# Patient Record
Sex: MALE | Race: BLACK OR AFRICAN AMERICAN
[De-identification: names, ages, dates, MRNs, and addresses within clinical notes are randomized per-mention and may not be internally consistent; named-entity substitution may affect disease eponyms.]

---

## 2017-06-05 ENCOUNTER — HOSPITAL ENCOUNTER (OUTPATIENT)
Dept: HOSPITAL 18 - NAV LABSP | Age: 72
Discharge: HOME | End: 2017-06-05
Attending: INTERNAL MEDICINE
Payer: MEDICARE

## 2017-06-05 DIAGNOSIS — I48.91: Primary | ICD-10-CM

## 2017-06-05 DIAGNOSIS — I10: ICD-10-CM

## 2017-06-05 DIAGNOSIS — E11.9: ICD-10-CM

## 2017-06-05 DIAGNOSIS — R56.9: ICD-10-CM

## 2017-06-05 PROCEDURE — 36415 COLL VENOUS BLD VENIPUNCTURE: CPT

## 2017-06-05 PROCEDURE — 80164 ASSAY DIPROPYLACETIC ACD TOT: CPT

## 2017-06-05 PROCEDURE — 80185 ASSAY OF PHENYTOIN TOTAL: CPT

## 2017-06-19 ENCOUNTER — HOSPITAL ENCOUNTER (OUTPATIENT)
Dept: HOSPITAL 18 - NAV LABSP | Age: 72
Discharge: HOME | End: 2017-06-19
Attending: INTERNAL MEDICINE
Payer: MEDICARE

## 2017-06-19 DIAGNOSIS — I10: ICD-10-CM

## 2017-06-19 DIAGNOSIS — I48.91: ICD-10-CM

## 2017-06-19 DIAGNOSIS — E11.9: Primary | ICD-10-CM

## 2017-06-19 DIAGNOSIS — R56.9: ICD-10-CM

## 2017-06-19 LAB
BASOPHILS # BLD AUTO: 0.1 THOU/UL (ref 0–0.2)
BASOPHILS NFR BLD AUTO: 0.9 % (ref 0–1)
CHD RISK SERPL-RTO: 3.2 (ref ?–4.5)
CHOLEST SERPL-MCNC: 152 MG/DL
DIGOXIN SERPL-MCNC: (no result) NG/ML (ref 0.8–2)
EOSINOPHIL # BLD AUTO: 0.1 THOU/UL (ref 0–0.7)
EOSINOPHIL NFR BLD AUTO: 0.8 % (ref 0–10)
GLUCOSE SERPL-MCNC: 81 MG/DL (ref 83–110)
HDLC SERPL-MCNC: 47 MG/DL
HGB BLD-MCNC: 12.7 G/DL (ref 14–18)
LDLC SERPL CALC-MCNC: 92 MG/DL
LYMPHOCYTES # BLD AUTO: 2.8 THOU/UL (ref 1.2–3.4)
LYMPHOCYTES NFR BLD AUTO: 30.1 % (ref 21–51)
MCH RBC QN AUTO: 28.1 PG (ref 27–31)
MCV RBC AUTO: 86.9 FL (ref 80–94)
MONOCYTES # BLD AUTO: 0.6 THOU/UL (ref 0.11–0.59)
MONOCYTES NFR BLD AUTO: 6.8 % (ref 0–10)
NEUTROPHILS # BLD AUTO: 5.8 THOU/UL (ref 1.4–6.5)
NEUTROPHILS NFR BLD AUTO: 61.4 % (ref 42–75)
PLATELET # BLD AUTO: 278 THOU/UL (ref 130–400)
RBC # BLD AUTO: 4.53 MILL/UL (ref 4.7–6.1)
TRIGL SERPL-MCNC: 65 MG/DL (ref ?–150)
WBC # BLD AUTO: 9.4 THOU/UL (ref 4.8–10.8)

## 2017-06-19 PROCEDURE — 80185 ASSAY OF PHENYTOIN TOTAL: CPT

## 2017-06-19 PROCEDURE — 82947 ASSAY GLUCOSE BLOOD QUANT: CPT

## 2017-06-19 PROCEDURE — 83036 HEMOGLOBIN GLYCOSYLATED A1C: CPT

## 2017-06-19 PROCEDURE — 85025 COMPLETE CBC W/AUTO DIFF WBC: CPT

## 2017-06-19 PROCEDURE — 80162 ASSAY OF DIGOXIN TOTAL: CPT

## 2017-06-19 PROCEDURE — 80061 LIPID PANEL: CPT

## 2017-06-19 PROCEDURE — 36415 COLL VENOUS BLD VENIPUNCTURE: CPT

## 2017-06-22 ENCOUNTER — HOSPITAL ENCOUNTER (OUTPATIENT)
Dept: HOSPITAL 18 - NAV LABSP | Age: 72
Discharge: HOME | End: 2017-06-22
Attending: INTERNAL MEDICINE
Payer: MEDICARE

## 2017-06-22 DIAGNOSIS — G40.89: Primary | ICD-10-CM

## 2017-06-22 PROCEDURE — 36415 COLL VENOUS BLD VENIPUNCTURE: CPT

## 2017-06-22 PROCEDURE — 80177 DRUG SCRN QUAN LEVETIRACETAM: CPT

## 2017-06-28 ENCOUNTER — HOSPITAL ENCOUNTER (OUTPATIENT)
Dept: HOSPITAL 18 - NAV NNR | Age: 72
Discharge: HOME | End: 2017-06-28
Attending: INTERNAL MEDICINE
Payer: MEDICARE

## 2017-06-28 DIAGNOSIS — R56.9: ICD-10-CM

## 2017-06-28 DIAGNOSIS — I48.0: Primary | ICD-10-CM

## 2017-06-28 DIAGNOSIS — E11.9: ICD-10-CM

## 2017-06-28 LAB
ALBUMIN SERPL BCG-MCNC: 3.6 G/DL (ref 3.4–4.8)
ALP SERPL-CCNC: 69 U/L (ref 40–150)
ALT SERPL W P-5'-P-CCNC: 13 U/L (ref 8–55)
ANION GAP SERPL CALC-SCNC: 15 MMOL/L (ref 10–20)
AST SERPL-CCNC: 11 U/L (ref 5–34)
BASOPHILS # BLD AUTO: 0.1 THOU/UL (ref 0–0.2)
BASOPHILS NFR BLD AUTO: 1.1 % (ref 0–1)
BILIRUB SERPL-MCNC: 0.3 MG/DL (ref 0.2–1.2)
BUN SERPL-MCNC: 12 MG/DL (ref 8.4–25.7)
CALCIUM SERPL-MCNC: 10 MG/DL (ref 7.8–10.44)
CHLORIDE SERPL-SCNC: 106 MMOL/L (ref 98–107)
CO2 SERPL-SCNC: 23 MMOL/L (ref 23–31)
CREAT CL PREDICTED SERPL C-G-VRATE: 0 ML/MIN (ref 70–130)
EOSINOPHIL # BLD AUTO: 0.1 THOU/UL (ref 0–0.7)
EOSINOPHIL NFR BLD AUTO: 1.1 % (ref 0–10)
GLOBULIN SER CALC-MCNC: 3.4 G/DL (ref 2.4–3.5)
GLUCOSE SERPL-MCNC: 83 MG/DL (ref 80–115)
HGB BLD-MCNC: 12.9 G/DL (ref 14–18)
LYMPHOCYTES # BLD AUTO: 2.8 THOU/UL (ref 1.2–3.4)
LYMPHOCYTES NFR BLD AUTO: 31.1 % (ref 21–51)
MCH RBC QN AUTO: 28.1 PG (ref 27–31)
MCV RBC AUTO: 88.5 FL (ref 80–94)
MONOCYTES # BLD AUTO: 0.6 THOU/UL (ref 0.11–0.59)
MONOCYTES NFR BLD AUTO: 7.1 % (ref 0–10)
NEUTROPHILS # BLD AUTO: 5.3 THOU/UL (ref 1.4–6.5)
NEUTROPHILS NFR BLD AUTO: 59.6 % (ref 42–75)
PLATELET # BLD AUTO: 178 THOU/UL (ref 130–400)
POTASSIUM SERPL-SCNC: 4.5 MMOL/L (ref 3.5–5.1)
RBC # BLD AUTO: 4.57 MILL/UL (ref 4.7–6.1)
SODIUM SERPL-SCNC: 139 MMOL/L (ref 136–145)
WBC # BLD AUTO: 8.9 THOU/UL (ref 4.8–10.8)

## 2017-06-28 PROCEDURE — 85025 COMPLETE CBC W/AUTO DIFF WBC: CPT

## 2017-06-28 PROCEDURE — 80053 COMPREHEN METABOLIC PANEL: CPT

## 2017-06-28 PROCEDURE — 80164 ASSAY DIPROPYLACETIC ACD TOT: CPT

## 2017-06-28 PROCEDURE — 80185 ASSAY OF PHENYTOIN TOTAL: CPT

## 2017-07-19 ENCOUNTER — HOSPITAL ENCOUNTER (OUTPATIENT)
Dept: HOSPITAL 18 - NAV LABSP | Age: 72
Discharge: HOME | End: 2017-07-19
Attending: INTERNAL MEDICINE
Payer: MEDICARE

## 2017-07-19 DIAGNOSIS — R56.9: Primary | ICD-10-CM

## 2017-07-19 PROCEDURE — 36415 COLL VENOUS BLD VENIPUNCTURE: CPT

## 2017-07-19 PROCEDURE — 80185 ASSAY OF PHENYTOIN TOTAL: CPT

## 2017-07-21 ENCOUNTER — HOSPITAL ENCOUNTER (EMERGENCY)
Dept: HOSPITAL 18 - NAV ERS | Age: 72
Discharge: HOME | End: 2017-07-21
Payer: MEDICARE
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
## 2017-07-21 DIAGNOSIS — I10: ICD-10-CM

## 2017-07-21 DIAGNOSIS — Z79.82: ICD-10-CM

## 2017-07-21 DIAGNOSIS — E11.9: ICD-10-CM

## 2017-07-21 DIAGNOSIS — I48.91: ICD-10-CM

## 2017-07-21 DIAGNOSIS — F41.9: ICD-10-CM

## 2017-07-21 DIAGNOSIS — E78.5: ICD-10-CM

## 2017-07-21 DIAGNOSIS — Z79.899: ICD-10-CM

## 2017-07-21 DIAGNOSIS — S01.01XA: Primary | ICD-10-CM

## 2017-07-21 PROCEDURE — 70450 CT HEAD/BRAIN W/O DYE: CPT

## 2017-07-21 PROCEDURE — 12002 RPR S/N/AX/GEN/TRNK2.6-7.5CM: CPT

## 2017-07-21 NOTE — CT
CT HEAD NONCONTRAST 

7/21/17

 

HISTORY: 

Fall. Head injury.

 

FINDINGS:  

Motion artifact obscures detail. There is no mass effect or shift of midline structures. No evidence
 of acute intracranial hemorrhage or infarct. Severe diffuse cortical atrophy is apparent. Scalp lac
eration is noted over the right frontal calvarium. 

 

IMPRESSION:  

Severe cortical atrophy. No acute intracranial abnormalities are demonstrated. 

 

POS: [facility]

## 2017-07-24 ENCOUNTER — HOSPITAL ENCOUNTER (OUTPATIENT)
Dept: HOSPITAL 18 - NAV LABSP | Age: 72
Discharge: HOME | End: 2017-07-24
Attending: INTERNAL MEDICINE
Payer: MEDICARE

## 2017-07-24 DIAGNOSIS — R56.9: Primary | ICD-10-CM

## 2017-07-24 PROCEDURE — 80185 ASSAY OF PHENYTOIN TOTAL: CPT

## 2017-07-24 PROCEDURE — 36415 COLL VENOUS BLD VENIPUNCTURE: CPT

## 2017-08-09 ENCOUNTER — HOSPITAL ENCOUNTER (OUTPATIENT)
Dept: HOSPITAL 18 - NAVSJIPCSP | Age: 72
Discharge: HOME | End: 2017-08-09
Attending: INTERNAL MEDICINE
Payer: MEDICARE

## 2017-08-09 DIAGNOSIS — R56.9: Primary | ICD-10-CM

## 2017-08-09 PROCEDURE — 80164 ASSAY DIPROPYLACETIC ACD TOT: CPT

## 2017-08-09 PROCEDURE — 36415 COLL VENOUS BLD VENIPUNCTURE: CPT

## 2017-08-09 PROCEDURE — 80185 ASSAY OF PHENYTOIN TOTAL: CPT

## 2017-08-11 ENCOUNTER — HOSPITAL ENCOUNTER (OUTPATIENT)
Dept: HOSPITAL 18 - NAV LABSP | Age: 72
Discharge: HOME | End: 2017-08-11
Attending: INTERNAL MEDICINE
Payer: MEDICARE

## 2017-08-11 DIAGNOSIS — R56.9: Primary | ICD-10-CM

## 2017-08-11 PROCEDURE — 36415 COLL VENOUS BLD VENIPUNCTURE: CPT

## 2017-08-11 PROCEDURE — 80185 ASSAY OF PHENYTOIN TOTAL: CPT

## 2017-08-16 ENCOUNTER — HOSPITAL ENCOUNTER (OUTPATIENT)
Dept: HOSPITAL 18 - NAV LABSP | Age: 72
Discharge: HOME | End: 2017-08-16
Attending: INTERNAL MEDICINE
Payer: MEDICARE

## 2017-08-16 DIAGNOSIS — R56.9: Primary | ICD-10-CM

## 2017-08-16 PROCEDURE — 80185 ASSAY OF PHENYTOIN TOTAL: CPT

## 2017-08-16 PROCEDURE — 36415 COLL VENOUS BLD VENIPUNCTURE: CPT

## 2017-08-21 ENCOUNTER — HOSPITAL ENCOUNTER (OUTPATIENT)
Dept: HOSPITAL 18 - NAV LABSP | Age: 72
Discharge: HOME | End: 2017-08-21
Attending: INTERNAL MEDICINE
Payer: MEDICARE

## 2017-08-21 DIAGNOSIS — R56.9: Primary | ICD-10-CM

## 2017-08-21 PROCEDURE — 36415 COLL VENOUS BLD VENIPUNCTURE: CPT

## 2017-08-21 PROCEDURE — 80185 ASSAY OF PHENYTOIN TOTAL: CPT

## 2017-09-26 ENCOUNTER — HOSPITAL ENCOUNTER (OUTPATIENT)
Dept: HOSPITAL 18 - NAV LABSP | Age: 72
Discharge: HOME | End: 2017-09-26
Attending: INTERNAL MEDICINE
Payer: MEDICARE

## 2017-09-26 DIAGNOSIS — E11.9: Primary | ICD-10-CM

## 2017-09-26 DIAGNOSIS — R56.9: ICD-10-CM

## 2017-09-26 DIAGNOSIS — I10: ICD-10-CM

## 2017-09-26 DIAGNOSIS — I48.91: ICD-10-CM

## 2017-09-26 LAB
CHD RISK SERPL-RTO: 3.3 (ref ?–4.5)
CHOLEST SERPL-MCNC: 163 MG/DL
DIGOXIN SERPL-MCNC: 0.17 NG/ML (ref 0.8–2)
GLUCOSE SERPL-MCNC: 69 MG/DL (ref 83–110)
HDLC SERPL-MCNC: 50 MG/DL
LDLC SERPL CALC-MCNC: 100 MG/DL
TRIGL SERPL-MCNC: 65 MG/DL (ref ?–150)

## 2017-09-26 PROCEDURE — 80162 ASSAY OF DIGOXIN TOTAL: CPT

## 2017-09-26 PROCEDURE — 80185 ASSAY OF PHENYTOIN TOTAL: CPT

## 2017-09-26 PROCEDURE — 80061 LIPID PANEL: CPT

## 2017-09-26 PROCEDURE — 83036 HEMOGLOBIN GLYCOSYLATED A1C: CPT

## 2017-09-26 PROCEDURE — 36415 COLL VENOUS BLD VENIPUNCTURE: CPT

## 2017-09-26 PROCEDURE — 82947 ASSAY GLUCOSE BLOOD QUANT: CPT

## 2018-08-31 ENCOUNTER — HOSPITAL ENCOUNTER (OUTPATIENT)
Dept: HOSPITAL 18 - NAV NNR | Age: 73
Discharge: HOME | End: 2018-08-31
Attending: INTERNAL MEDICINE
Payer: MEDICARE

## 2018-08-31 DIAGNOSIS — G40.909: Primary | ICD-10-CM

## 2018-08-31 LAB
ALBUMIN SERPL BCG-MCNC: 3.5 G/DL (ref 3.4–4.8)
ALP SERPL-CCNC: 84 U/L (ref 40–150)
ALT SERPL W P-5'-P-CCNC: 13 U/L (ref 8–55)
ANION GAP SERPL CALC-SCNC: 12 MMOL/L (ref 10–20)
AST SERPL-CCNC: 13 U/L (ref 5–34)
BASOPHILS # BLD AUTO: 0.1 THOU/UL (ref 0–0.2)
BASOPHILS NFR BLD AUTO: 1 % (ref 0–1)
BILIRUB SERPL-MCNC: 0.3 MG/DL (ref 0.2–1.2)
BUN SERPL-MCNC: 14 MG/DL (ref 8.4–25.7)
CALCIUM SERPL-MCNC: 9.7 MG/DL (ref 7.8–10.44)
CHLORIDE SERPL-SCNC: 105 MMOL/L (ref 98–107)
CO2 SERPL-SCNC: 28 MMOL/L (ref 23–31)
CREAT CL PREDICTED SERPL C-G-VRATE: 0 ML/MIN (ref 70–130)
EOSINOPHIL # BLD AUTO: 0.2 THOU/UL (ref 0–0.7)
EOSINOPHIL NFR BLD AUTO: 2 % (ref 0–10)
GLOBULIN SER CALC-MCNC: 3.1 G/DL (ref 2.4–3.5)
GLUCOSE SERPL-MCNC: 117 MG/DL (ref 83–110)
HGB BLD-MCNC: 13.8 G/DL (ref 14–18)
LYMPHOCYTES # BLD AUTO: 2.9 THOU/UL (ref 1.2–3.4)
LYMPHOCYTES NFR BLD AUTO: 29.6 % (ref 21–51)
MCH RBC QN AUTO: 26.4 PG (ref 27–31)
MCV RBC AUTO: 86.3 FL (ref 78–98)
MONOCYTES # BLD AUTO: 0.9 THOU/UL (ref 0.11–0.59)
MONOCYTES NFR BLD AUTO: 8.8 % (ref 0–10)
NEUTROPHILS # BLD AUTO: 5.8 THOU/UL (ref 1.4–6.5)
NEUTROPHILS NFR BLD AUTO: 58.6 % (ref 42–75)
PLATELET # BLD AUTO: 303 THOU/UL (ref 130–400)
POTASSIUM SERPL-SCNC: 4.7 MMOL/L (ref 3.5–5.1)
RBC # BLD AUTO: 5.24 MILL/UL (ref 4.7–6.1)
SODIUM SERPL-SCNC: 140 MMOL/L (ref 136–145)
WBC # BLD AUTO: 9.9 THOU/UL (ref 4.8–10.8)

## 2018-08-31 PROCEDURE — 85025 COMPLETE CBC W/AUTO DIFF WBC: CPT

## 2018-08-31 PROCEDURE — 80185 ASSAY OF PHENYTOIN TOTAL: CPT

## 2018-08-31 PROCEDURE — 80053 COMPREHEN METABOLIC PANEL: CPT

## 2018-09-03 ENCOUNTER — HOSPITAL ENCOUNTER (EMERGENCY)
Dept: HOSPITAL 18 - NAV ERS | Age: 73
Discharge: NURSING, REHAB OR LONG-TERM CARE | End: 2018-09-03
Payer: MEDICARE

## 2018-09-03 DIAGNOSIS — R79.89: ICD-10-CM

## 2018-09-03 DIAGNOSIS — Z79.899: ICD-10-CM

## 2018-09-03 DIAGNOSIS — F32.9: ICD-10-CM

## 2018-09-03 DIAGNOSIS — E78.5: ICD-10-CM

## 2018-09-03 DIAGNOSIS — N39.0: Primary | ICD-10-CM

## 2018-09-03 DIAGNOSIS — G40.909: ICD-10-CM

## 2018-09-03 DIAGNOSIS — F02.80: ICD-10-CM

## 2018-09-03 DIAGNOSIS — K21.9: ICD-10-CM

## 2018-09-03 DIAGNOSIS — I48.91: ICD-10-CM

## 2018-09-03 DIAGNOSIS — E11.9: ICD-10-CM

## 2018-09-03 DIAGNOSIS — G30.9: ICD-10-CM

## 2018-09-03 DIAGNOSIS — Z79.82: ICD-10-CM

## 2018-09-03 DIAGNOSIS — I10: ICD-10-CM

## 2018-09-03 LAB
ANION GAP SERPL CALC-SCNC: 16 MMOL/L (ref 10–20)
BASOPHILS # BLD AUTO: 0 THOU/UL (ref 0–0.2)
BASOPHILS NFR BLD AUTO: 1 % (ref 0–1)
BUN SERPL-MCNC: 14 MG/DL (ref 8.4–25.7)
CALCIUM SERPL-MCNC: 9.5 MG/DL (ref 7.8–10.44)
CHLORIDE SERPL-SCNC: 108 MMOL/L (ref 98–107)
CO2 SERPL-SCNC: 22 MMOL/L (ref 23–31)
CREAT CL PREDICTED SERPL C-G-VRATE: 0 ML/MIN (ref 70–130)
EOSINOPHIL # BLD AUTO: 0.1 THOU/UL (ref 0–0.7)
EOSINOPHIL NFR BLD AUTO: 1.2 % (ref 0–10)
GLUCOSE SERPL-MCNC: 75 MG/DL (ref 83–110)
HGB BLD-MCNC: 13.3 G/DL (ref 14–18)
LYMPHOCYTES # BLD AUTO: 1.9 THOU/UL (ref 1.2–3.4)
LYMPHOCYTES NFR BLD AUTO: 40.5 % (ref 21–51)
MCH RBC QN AUTO: 27.4 PG (ref 27–31)
MCV RBC AUTO: 87.4 FL (ref 78–98)
MONOCYTES # BLD AUTO: 0.4 THOU/UL (ref 0.11–0.59)
MONOCYTES NFR BLD AUTO: 9.3 % (ref 0–10)
NEUTROPHILS # BLD AUTO: 2.3 THOU/UL (ref 1.4–6.5)
NEUTROPHILS NFR BLD AUTO: 48.1 % (ref 42–75)
PLATELET # BLD AUTO: (no result) THOU/UL (ref 130–400)
PLATELET BLD QL SMEAR: (no result)
POTASSIUM SERPL-SCNC: 5.6 MMOL/L (ref 3.5–5.1)
PROT UR STRIP.AUTO-MCNC: 30 MG/DL
RBC # BLD AUTO: 4.87 MILL/UL (ref 4.7–6.1)
RBC UR QL AUTO: (no result) HPF (ref 0–3)
SODIUM SERPL-SCNC: 140 MMOL/L (ref 136–145)
SP GR UR STRIP: 1.03 (ref 1–1.04)
UNIDENT CRYS #/AREA URNS HPF: (no result) HPF
WBC # BLD AUTO: 4.7 THOU/UL (ref 4.8–10.8)
WBC UR QL AUTO: (no result) HPF (ref 0–3)

## 2018-09-03 PROCEDURE — 82140 ASSAY OF AMMONIA: CPT

## 2018-09-03 PROCEDURE — 96361 HYDRATE IV INFUSION ADD-ON: CPT

## 2018-09-03 PROCEDURE — 81015 MICROSCOPIC EXAM OF URINE: CPT

## 2018-09-03 PROCEDURE — 51701 INSERT BLADDER CATHETER: CPT

## 2018-09-03 PROCEDURE — 85025 COMPLETE CBC W/AUTO DIFF WBC: CPT

## 2018-09-03 PROCEDURE — 96360 HYDRATION IV INFUSION INIT: CPT

## 2018-09-03 PROCEDURE — 80048 BASIC METABOLIC PNL TOTAL CA: CPT

## 2018-09-03 PROCEDURE — 81003 URINALYSIS AUTO W/O SCOPE: CPT

## 2018-09-03 PROCEDURE — 80185 ASSAY OF PHENYTOIN TOTAL: CPT

## 2018-09-03 PROCEDURE — 93005 ELECTROCARDIOGRAM TRACING: CPT

## 2019-05-17 ENCOUNTER — HOSPITAL ENCOUNTER (OUTPATIENT)
Dept: HOSPITAL 18 - NAV LABSP | Age: 74
Discharge: HOME | End: 2019-05-17
Attending: INTERNAL MEDICINE
Payer: MEDICARE

## 2019-05-17 DIAGNOSIS — G40.909: Primary | ICD-10-CM

## 2019-05-17 LAB
BASOPHILS # BLD AUTO: 0.1 THOU/UL (ref 0–0.2)
BASOPHILS NFR BLD AUTO: 0.8 % (ref 0–1)
EOSINOPHIL # BLD AUTO: 0 THOU/UL (ref 0–0.7)
EOSINOPHIL NFR BLD AUTO: 0.3 % (ref 0–10)
HGB BLD-MCNC: 12.2 G/DL (ref 14–18)
LYMPHOCYTES # BLD AUTO: 2 THOU/UL (ref 1.2–3.4)
LYMPHOCYTES NFR BLD AUTO: 23.7 % (ref 21–51)
MCH RBC QN AUTO: 27.2 PG (ref 27–31)
MCV RBC AUTO: 87.6 FL (ref 78–98)
MONOCYTES # BLD AUTO: 0.9 THOU/UL (ref 0.11–0.59)
MONOCYTES NFR BLD AUTO: 10.3 % (ref 0–10)
NEUTROPHILS # BLD AUTO: 5.6 THOU/UL (ref 1.4–6.5)
NEUTROPHILS NFR BLD AUTO: 64.9 % (ref 42–75)
PLATELET # BLD AUTO: 299 THOU/UL (ref 130–400)
RBC # BLD AUTO: 4.47 MILL/UL (ref 4.7–6.1)
WBC # BLD AUTO: 8.6 THOU/UL (ref 4.8–10.8)

## 2019-05-17 PROCEDURE — 36415 COLL VENOUS BLD VENIPUNCTURE: CPT

## 2019-05-17 PROCEDURE — 85025 COMPLETE CBC W/AUTO DIFF WBC: CPT

## 2019-10-09 ENCOUNTER — HOSPITAL ENCOUNTER (EMERGENCY)
Dept: HOSPITAL 18 - NAV ERS | Age: 74
Discharge: NURSING, REHAB OR LONG-TERM CARE | End: 2019-10-09
Payer: MEDICARE

## 2019-10-09 DIAGNOSIS — F32.9: ICD-10-CM

## 2019-10-09 DIAGNOSIS — I48.91: ICD-10-CM

## 2019-10-09 DIAGNOSIS — F25.9: ICD-10-CM

## 2019-10-09 DIAGNOSIS — R50.9: Primary | ICD-10-CM

## 2019-10-09 DIAGNOSIS — E78.5: ICD-10-CM

## 2019-10-09 DIAGNOSIS — Z79.899: ICD-10-CM

## 2019-10-09 DIAGNOSIS — I10: ICD-10-CM

## 2019-10-09 DIAGNOSIS — I49.9: ICD-10-CM

## 2019-10-09 DIAGNOSIS — E11.9: ICD-10-CM

## 2019-10-09 DIAGNOSIS — F02.80: ICD-10-CM

## 2019-10-09 DIAGNOSIS — K21.9: ICD-10-CM

## 2019-10-09 DIAGNOSIS — G30.9: ICD-10-CM

## 2019-10-09 DIAGNOSIS — G40.909: ICD-10-CM

## 2019-10-09 DIAGNOSIS — Z79.82: ICD-10-CM

## 2019-10-09 PROCEDURE — 99283 EMERGENCY DEPT VISIT LOW MDM: CPT

## 2019-10-09 PROCEDURE — 87804 INFLUENZA ASSAY W/OPTIC: CPT

## 2020-09-28 ENCOUNTER — HOSPITAL ENCOUNTER (EMERGENCY)
Dept: HOSPITAL 18 - NAV ERS | Age: 75
Discharge: ANOTHER HOSPITAL | End: 2020-09-28
Payer: MEDICARE

## 2020-09-28 ENCOUNTER — HOSPITAL ENCOUNTER (INPATIENT)
Dept: HOSPITAL 92 - ERS | Age: 75
LOS: 4 days | Discharge: NURSING, REHAB OR LONG-TERM CARE | DRG: 27 | End: 2020-10-02
Attending: SPECIALIST | Admitting: SPECIALIST
Payer: MEDICARE

## 2020-09-28 VITALS — BODY MASS INDEX: 13.6 KG/M2

## 2020-09-28 DIAGNOSIS — R40.2123: ICD-10-CM

## 2020-09-28 DIAGNOSIS — Z79.82: ICD-10-CM

## 2020-09-28 DIAGNOSIS — S06.5X0A: Primary | ICD-10-CM

## 2020-09-28 DIAGNOSIS — G40.909: ICD-10-CM

## 2020-09-28 DIAGNOSIS — S01.81XA: ICD-10-CM

## 2020-09-28 DIAGNOSIS — F32.9: ICD-10-CM

## 2020-09-28 DIAGNOSIS — G30.9: ICD-10-CM

## 2020-09-28 DIAGNOSIS — F02.80: ICD-10-CM

## 2020-09-28 DIAGNOSIS — Z79.899: ICD-10-CM

## 2020-09-28 DIAGNOSIS — I48.91: ICD-10-CM
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
## 2020-09-28 DIAGNOSIS — R40.2343: ICD-10-CM

## 2020-09-28 DIAGNOSIS — R40.2233: ICD-10-CM

## 2020-09-28 DIAGNOSIS — E11.9: ICD-10-CM

## 2020-09-28 DIAGNOSIS — I49.9: ICD-10-CM

## 2020-09-28 DIAGNOSIS — E63.9: ICD-10-CM

## 2020-09-28 DIAGNOSIS — Z23: ICD-10-CM

## 2020-09-28 DIAGNOSIS — K21.9: ICD-10-CM

## 2020-09-28 DIAGNOSIS — Z20.828: ICD-10-CM

## 2020-09-28 DIAGNOSIS — E78.5: ICD-10-CM

## 2020-09-28 DIAGNOSIS — I10: ICD-10-CM

## 2020-09-28 DIAGNOSIS — S06.5X9A: Primary | ICD-10-CM

## 2020-09-28 DIAGNOSIS — Z90.5: ICD-10-CM
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
## 2020-09-28 DIAGNOSIS — F41.9: ICD-10-CM
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
## 2020-09-28 DIAGNOSIS — F25.9: ICD-10-CM

## 2020-09-28 DIAGNOSIS — K70.30: ICD-10-CM

## 2020-09-28 LAB
ALBUMIN SERPL BCG-MCNC: 4.2 G/DL (ref 3.4–4.8)
ALP SERPL-CCNC: 93 U/L (ref 40–110)
ALT SERPL W P-5'-P-CCNC: 13 U/L (ref 8–55)
ANION GAP SERPL CALC-SCNC: 18 MMOL/L (ref 10–20)
APTT PPP: 27.6 SEC (ref 22.9–36.1)
AST SERPL-CCNC: 19 U/L (ref 5–34)
BASOPHILS # BLD AUTO: 0.1 THOU/UL (ref 0–0.2)
BASOPHILS NFR BLD AUTO: 0.4 % (ref 0–1)
BILIRUB SERPL-MCNC: 0.5 MG/DL (ref 0.2–1.2)
BUN SERPL-MCNC: 9 MG/DL (ref 8.4–25.7)
CALCIUM SERPL-MCNC: 10.5 MG/DL (ref 7.8–10.44)
CHLORIDE SERPL-SCNC: 106 MMOL/L (ref 98–107)
CO2 SERPL-SCNC: 24 MMOL/L (ref 23–31)
CREAT CL PREDICTED SERPL C-G-VRATE: 0 ML/MIN (ref 70–130)
EOSINOPHIL # BLD AUTO: 0 THOU/UL (ref 0–0.7)
EOSINOPHIL NFR BLD AUTO: 0.1 % (ref 0–10)
GLOBULIN SER CALC-MCNC: 3.6 G/DL (ref 2.4–3.5)
GLUCOSE SERPL-MCNC: 136 MG/DL (ref 83–110)
HGB BLD-MCNC: 12.6 G/DL (ref 14–18)
INR PPP: 1.1
LYMPHOCYTES # BLD AUTO: 1.7 THOU/UL (ref 1.2–3.4)
LYMPHOCYTES NFR BLD AUTO: 9.7 % (ref 21–51)
MCH RBC QN AUTO: 26.8 PG (ref 27–31)
MCV RBC AUTO: 89.5 FL (ref 78–98)
MDIFF COMPLETE?: YES
MONOCYTES # BLD AUTO: 1.2 THOU/UL (ref 0.11–0.59)
MONOCYTES NFR BLD AUTO: 6.7 % (ref 0–10)
NEUTROPHILS # BLD AUTO: 14.8 THOU/UL (ref 1.4–6.5)
NEUTROPHILS NFR BLD AUTO: 83.1 % (ref 42–75)
PLATELET # BLD AUTO: 328 THOU/UL (ref 130–400)
POTASSIUM SERPL-SCNC: 4.3 MMOL/L (ref 3.5–5.1)
PROTHROMBIN TIME: 13.7 SEC (ref 12–14.7)
RBC # BLD AUTO: 4.71 MILL/UL (ref 4.7–6.1)
SODIUM SERPL-SCNC: 144 MMOL/L (ref 136–145)
TARGETS BLD QL SMEAR: (no result) (100X)
WBC # BLD AUTO: 17.8 THOU/UL (ref 4.8–10.8)

## 2020-09-28 PROCEDURE — 94002 VENT MGMT INPAT INIT DAY: CPT

## 2020-09-28 PROCEDURE — 72125 CT NECK SPINE W/O DYE: CPT

## 2020-09-28 PROCEDURE — S0028 INJECTION, FAMOTIDINE, 20 MG: HCPCS

## 2020-09-28 PROCEDURE — 36415 COLL VENOUS BLD VENIPUNCTURE: CPT

## 2020-09-28 PROCEDURE — G0390 TRAUMA RESPONS W/HOSP CRITI: HCPCS

## 2020-09-28 PROCEDURE — 93005 ELECTROCARDIOGRAM TRACING: CPT

## 2020-09-28 PROCEDURE — 74018 RADEX ABDOMEN 1 VIEW: CPT

## 2020-09-28 PROCEDURE — 87635 SARS-COV-2 COVID-19 AMP PRB: CPT

## 2020-09-28 PROCEDURE — 84484 ASSAY OF TROPONIN QUANT: CPT

## 2020-09-28 PROCEDURE — U0003 INFECTIOUS AGENT DETECTION BY NUCLEIC ACID (DNA OR RNA); SEVERE ACUTE RESPIRATORY SYNDROME CORONAVIRUS 2 (SARS-COV-2) (CORONAVIRUS DISEASE [COVID-19]), AMPLIFIED PROBE TECHNIQUE, MAKING USE OF HIGH THROUGHPUT TECHNOLOGIES AS DESCRIBED BY CMS-2020-01-R: HCPCS

## 2020-09-28 PROCEDURE — 12001 RPR S/N/AX/GEN/TRNK 2.5CM/<: CPT

## 2020-09-28 PROCEDURE — 80053 COMPREHEN METABOLIC PANEL: CPT

## 2020-09-28 PROCEDURE — 70450 CT HEAD/BRAIN W/O DYE: CPT

## 2020-09-28 PROCEDURE — 90471 IMMUNIZATION ADMIN: CPT

## 2020-09-28 PROCEDURE — 36416 COLLJ CAPILLARY BLOOD SPEC: CPT

## 2020-09-28 PROCEDURE — 94003 VENT MGMT INPAT SUBQ DAY: CPT

## 2020-09-28 PROCEDURE — 85730 THROMBOPLASTIN TIME PARTIAL: CPT

## 2020-09-28 PROCEDURE — 84100 ASSAY OF PHOSPHORUS: CPT

## 2020-09-28 PROCEDURE — 85610 PROTHROMBIN TIME: CPT

## 2020-09-28 PROCEDURE — 85025 COMPLETE CBC W/AUTO DIFF WBC: CPT

## 2020-09-28 PROCEDURE — 82805 BLOOD GASES W/O2 SATURATION: CPT

## 2020-09-28 PROCEDURE — 90715 TDAP VACCINE 7 YRS/> IM: CPT

## 2020-09-28 PROCEDURE — 71045 X-RAY EXAM CHEST 1 VIEW: CPT

## 2020-09-28 PROCEDURE — 80048 BASIC METABOLIC PNL TOTAL CA: CPT

## 2020-09-28 PROCEDURE — 83735 ASSAY OF MAGNESIUM: CPT

## 2020-09-29 LAB
ANALYZER IN CARDIO: (no result)
BASE EXCESS STD BLDA CALC-SCNC: -5.1 MEQ/L
CA-I BLDA-SCNC: 1.28 MMOL/L (ref 1.12–1.3)
HCO3 BLDA-SCNC: 20.8 MEQ/L (ref 22–28)
HCT VFR BLDA CALC: 38 % (ref 42–52)
HGB BLDA-MCNC: 13 G/DL (ref 14–18)
MAGNESIUM SERPL-MCNC: 2.1 MG/DL (ref 1.6–2.6)
O2 A-A PPRESDIFF RESPIRATORY: 44.38 MMHG (ref 0–20)
PCO2 BLDA: 41.7 MMHG (ref 35–45)
PH BLDA: 7.32 [PH] (ref 7.35–7.45)
PO2 BLDA: 188.7 MMHG (ref 70–?)
POTASSIUM BLD-SCNC: 3.6 MMOL/L (ref 3.7–5.3)
SPECIMEN DRAWN FROM PATIENT: (no result)

## 2020-09-29 PROCEDURE — 00940ZZ DRAINAGE OF INTRACRANIAL SUBDURAL SPACE, OPEN APPROACH: ICD-10-PCS | Performed by: NEUROLOGICAL SURGERY

## 2020-09-29 RX ADMIN — LEVETIRACETAM SCH MLS: 10 INJECTION INTRAVENOUS at 22:49

## 2020-09-29 RX ADMIN — LEVETIRACETAM SCH MLS: 10 INJECTION INTRAVENOUS at 11:08

## 2020-09-29 RX ADMIN — FAMOTIDINE SCH MG: 10 INJECTION, SOLUTION INTRAVENOUS at 10:41

## 2020-09-29 RX ADMIN — CEFAZOLIN SODIUM SCH MLS: 2 SOLUTION INTRAVENOUS at 23:42

## 2020-09-29 RX ADMIN — CEFAZOLIN SODIUM SCH MLS: 2 SOLUTION INTRAVENOUS at 16:23

## 2020-09-29 NOTE — RAD
KUB:

 

INDICATION: 

NG tube placement.

 

FINDINGS: 

There is a Dobbhoff feed tube tip in the left lower lobe.  The lung bass are clear.  The bowel gas pa
ttern is unobstructed.  There is a retained metallic density in the right hemipelvis.  There are surg
ical clips within the upper abdomen.

 

IMPRESSION: 

Dobbhoff feeding tube tip in the left lower lobe.  

 

Findings were called to Florence Oneill, caring for this patient, at 3:00 p.m. on 9/29/2020.

 

CODE CR

 

POS: [facility]

## 2020-09-29 NOTE — OP
DATE OF PROCEDURE:  09/29/2020



FIRST ASSISTANT:  Brayan Barlow PA-C



INDICATION:  Prevent neurologic decline.



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



PROCEDURE PERFORMED:  Bilateral frontal alvin hole placement with evacuation of

hematoma. 



ANESTHESIA:  General.



DESCRIPTION OF PROCEDURE:  The patient was brought into the operating room and

placed under general anesthesia.  He was placed on table in a supine position.  
Two

small linear incisions were planned, one on the left and one on the right along 
the

frontal boss.  These areas were prepped and draped in the usual sterile fashion 
and

the skin was infiltrated subcutaneously with Marcaine with epinephrine.  After 
an

appropriate operative pause, the incisions were created.  Self-retaining 
retractors

were placed.  The  was used to place a alvin hole within each incision

site.  The underlying dura was identified and bipolar cautery was used to 
maintain

hemostasis.  A cruciate incision was then placed within the dura, where there 
was

immediate egress of chronic blood under quite a bit of pressure more so on the

right than on the left.  We spent a copious amount of irrigation, irrigating 
these 2

areas out until the blood started to clear.  Two red rubber tubes were then 
placed

in the subdural space through each hole and brought out through separate 
puncture

sites.  The wound was then irrigated.  Hemostasis was maintained throughout.  
The

wounds were then closed in anatomic layers, and a pressure dressing was applied.

There were no known procedural complications. 







Job ID:  [id number]



[facility]

## 2020-09-29 NOTE — CT
CT BRAIN WITHOUT CONTRAST:

 

HISTORY: 

Fall, seizure.

 

FINDINGS: 

Comparison is made with the exam of 7/21/2017.

 

Bilateral subdural hematomas are seen with 1 cm mediastinal shift to the left.  The right-sided subdu
ral hematoma is larger than the left and has a somewhat subacute appearance.  There is acute on chron
ic hemorrhage in the left subdural hematoma.  

 

The bony calvarium appears intact.  The visualized paranasal sinuses and mastoid air cells are well a
erated.

 

IMPRESSION: 

Bilateral subdural hematomas (acute on chronic on the left), right larger than left with subfalcine h
erniation to the left.

 

Discussed over the telephone with ER physician, Dr. Everardo Curtis, at 10:23 p.m.

 

CODE CR

 

POS: OFF

## 2020-09-29 NOTE — CT
PRELIMINARY REPORT/[facility]/EMERGENCY AFTER HOURS PROCEDURE:

 

EXAM: CT Cervical Spine Without Intravenous Contrast. 

 

CLINICAL HISTORY: M75,  C-COLLAR IN PLACE; FALL AT [facility] TODAY; NIGHT NURSE REPORTS ACTING S
TRANGE; UNSURE TIME HAPPENED; NOT ON BLOOD THINERS 

 

TECHNIQUE: Axial computed tomography images of the cervical spine without intravenous contrast. Sagit
danii and coronal reformations performed. 

 

COMPARISON: None provided. 

 

FINDINGS: 

BONES: No acute fracture or focal osseous lesion. Bony alignment is anatomic. 

DISCS / DEGENERATIVE CHANGES: Multilevel degenerative changes of the cervical spine with intervertebr
al disc space narrowing, facet and uncovertebral hypertrophy with spinal canal and neuroforaminal mary
nosis . Multilevel large anterior osteophytes. 

SOFT TISSUES: No prevertebral soft tissue swelling. No apical pneumothorax. 

 

IMPRESSION: No acute cervical spine abnormality.

 

ELECTRONICALLY SIGNED BY:

Mackenzie Lanza MD

Sep 29, 2020 12:41:33 AM CDT

 

 

FINAL REPORT 

 

EMERGENT AFTER HOURS CT CERVICAL SPINE WITHOUT CONTRAST:

 

HISTORY: 

Fall with neck pain.

 

FINDINGS/IMPRESSION: 

I agree with the findings and impression given in the preliminary report per [facility] physici
an.  Moderate degenerative changes of the cervical spine without acute osseous abnormality.

 

POS: OFF

## 2020-09-29 NOTE — PRG
DATE OF SERVICE:  09/29/2020



Mr. Esparza is a 75-year-old gentleman who resides in a nursing home with baseline

severe dementia.  He sustained a fall yesterday where he had a head laceration over

the left frontal region that would not hemostase.  As a result, he was brought into

the ER, where he underwent a head CT in [place].  It revealed the presence of what

was a predominantly chronic large subdural hematoma over the right frontoparietal

and temporal convexity with significant degree of midline shift.  Despite the fall

and despite imaging findings per Nursing, he is what is believed to be in his

baseline status.  He does take an adult aspirin but no other known blood thinners. 



The plan is to move forward with alvin hole evacuation of the hematoma this morning.

He could not consent secondary to his level of dementia and his nearly obtunded

state.  We have not been successful in reaching next of kin.  I have tried as has

Nursing.  We will move forward with implied emergent consent. 







Job ID:  [id number]

## 2020-09-29 NOTE — RAD
Portable frontal chest radiograph:

9/29/2020



COMPARISON: 11/16/2013



HISTORY: Altered mental status, preop patient



FINDINGS: Multiple postoperative clips are noted in the left upper quadrant. Stable hyperinflation. S
table old left clavicle fracture. No pneumothorax, pleural fluid, focal consolidation, or alveolar

edema.



IMPRESSION: No acute findings.



Reported By: Sohail Dupont 

Electronically Signed:  9/29/2020 7:38 AM

## 2020-09-29 NOTE — CON
DATE OF CONSULTATION:  



HISTORY OF PRESENT ILLNESS:  Mr. Esparza is a 75-year-old man, resident of a 
nursing

home, who reportedly found down earlier this afternoon at his nursing home on 
the

floor with a bleeding laceration to the scalp, left frontal over the eyebrow.  
Day nurse placed a bandage over the wound and helped him back to bed.  Night 
shift came and upon initial evaluation,

noted that he was continuing to bleed and he was somewhat altered from his 
baseline

mental status.  He is an Alzheimer's patient with rather profound degree of

dementia, who is A and O x0 and apparently and while awake, he is

extraordinarily combative and sounds not ambulatory.  CT scan performed at [facility] reveals profoundly large bilateral subdural hematomas, right greater 
the

left with extraordinary compression of the underlying brain parenchyma, again 
right

worse than left.  All of this looks to be chronic in nature with only some scant

acute hemorrhage in the left subdural.  There is a slight degree of 
right-to-left

midline shift.  Again, the distortion and the degree of compression to the brain

parenchyma diffusely is rather profound.  He does have likely ventriculomegaly,

though difficult to assess this given the degree of compression and distortion 
of

the intracranial vault.  ED 

reports the patient was not on blood thinners, although within his current

medications he does take 325 mg aspirin daily.  This certainly contributes to

ongoing hemorrhage. 



CURRENT MEDICATIONS:  

1. Zantac.

2. Atenolol.

3. Depakote ER.

4. Phenytoin.

5. Remeron.



ALLERGIES:  NO KNOWN DRUG ALLERGIES.



PAST MEDICAL HISTORY:  Cardiac arrhythmia, atrial fibrillation, diabetes type 2,

cirrhosis of the liver, gastroesophageal reflux, hyperlipidemia, and 
Alzheimer's. 



SURGICAL HISTORY:  None listed. 



Family contact, unavailable, none listed.



PHYSICAL EXAMINATION:

At the bedside, patient again is awake.  He does intermittently follow simple

commands, namely wiggling his toes and squeezing my hand with bilateral upper

extremities.  He will flutter his eyelids when asked to open his eyes, but only

mumbles when spoken to.  Pupils are unequal, left is constricted measuring 
roughly 1

mm to 2 mm and right is 3 mm, both are sluggishly reactive to light.  He has 
pretty

dense cataracts in both eyes.  Extraocular movements appear to be intact as he 
tries

to move his eyes away from the light.  He is constantly changing position in the
bed

and moving his legs around.  He reacts briskly to pain, withdrawing rather 
quickly. 



ASSESSMENT:  Acute on chronic subdural hematoma bilaterally with altered mental

status.  History of Alzheimer's. 



PLAN:  At this time, patient will be admitted to the hospital at [facility] for

close observation and bilateral frontal alvin hole and evacuation of bilateral

subdurals in the morning.  We will make attempts to reach out the family or next
of

contact to obtain consent for surgical intervention. 







Job ID:  [id number]



[facility]...

## 2020-09-29 NOTE — CT
PRELIMINARY REPORT/[facility]/EMERGENCY AFTER HOURS PROCEDURE:

 

Prior study September 28, 2020 at 9:58 PM submitted for comparison 

Extra-axial collections are stable when compared to prior CT scan. 

Stable mass-effect on the right cerebral hemisphere Midline shift measured approximately 8-9 mm.Josefa oneill electronically signed by Beba Stanton MD on September 29, 2020 2:49:31 AM CDT

 

 

 

POS: OFF

## 2020-09-29 NOTE — PRG
DATE OF SERVICE:  09/29/2020



HISTORY OF PRESENT ILLNESS:  The patient was admitted late last night, status post

ground level fall with delayed presentation, in which he sustained bilateral

subdural hematomas, right side being chronic with the left side being acute on

chronic.  The patient underwent alvin hole placement bilaterally today, which he

tolerated well.  Unfortunately, the patient was unable to be extubated due to being

very slow to awaken.  This morning, we saw the patient when his anesthesia was

discontinued.  He was very slow to again arouse and awaken, so it was felt that it

was best to allow him to remain on the ventilator overnight.  We will attempt to

extubate again in the morning.  The patient also had a Dobhoff tube placed as it is

suspected that he may require nutrition via Dobhoff during the stay. 



PHYSICAL EXAMINATION:

VITAL SIGNS:  Temperature is 98.1, heart rate 73, respirations 15, oxygen saturation

is 98%, and blood pressure is 109/65. 

GENERAL:  The patient is resting in bed.  When sedation is off, he would open his

eyes, but did not follow commands, by history this may be his baseline, but the

patient did become somnolent relatively quickly and was not overbreathing the vent.

He was moving all 4 extremities. 

HEENT:  Head, the patient has two alvin holes noted with drains in place. 

LUNGS:  Clear to auscultation bilaterally. 

HEART:  Regular rate and rhythm. 

ABDOMEN:  Soft and nontender with hypoactive bowel sounds. 

EXTREMITIES:  Capillary refill less than 3 seconds.  Pulses are 2+ x4.



LABORATORY DATA:  A pH 7.32, pO2 of 188, pCO2 of 41.7, base excess is -5.1.



RADIOGRAPHIC DATA:  There are no new radiographs to review this morning.



ASSESSMENT AND PLAN:  

1. Status post ground level fall, unwitnessed, with delayed presentation.

2. Bilateral subdural hematomas, right greater than left with midline shift, left

being acute on chronic, right being chronic. 

3. History of Alzheimer's, type 2 diabetes, atrial fibrillation, seizures,

hypertension, nutritional deficiency, depression, gastroesophageal reflux disease,

dysphagia, dementia, hyperlipidemia, alcoholic cirrhosis without ascites and

constipation. 

4. Status post bilateral alvin hole placement. 

Plan will be to continue full mechanical ventilatory support, trickle tube feeds

started tonight.  We have the nurses contacting his Nursing Facility for his home

medicine reconciliation.  We will reassess the patient in the morning and assess if

he is able to be extubated.  The patient was evaluated this morning and throughout

the day with Dr. Oneill. 







Job ID:  [id number]

## 2020-09-29 NOTE — HP
TRAUMA SURGEON:  Dr. Silva.



CONSULTING PHYSICIAN:  Dr. Nelson.



HISTORY OF PRESENT ILLNESS:  The patient is a 75-year-old male, who originally

presented to the [facility] from his nursing home after a 
fall.

Nursing home reports the patient was found down out of his bed around 1500 
yesterday

afternoon.  At that time, he had an abrasion, laceration to his left forehead.  
It

was bandaged and he was put back in the bed.  During the evening shift at the

nursing home, they reported increased bleeding from his forehead and he was sent
to

the [facility].  Over that location, the patient was 
evaluated

and found to have a very large subdural hemorrhage bilaterally on the right, 
worse

in the left with midline shift.  He was transferred to [facility]

[facility] for evaluation by Neurosurgery.  Upon my evaluation, the patient's

neurological status had declined from what was reported to me by the resident

physician, who contacted me to admit the patient.  My GCS was a total of 9, that
is

eyes 2, verbal 3, motor 4.  I did contact Peter Barlow, Dr. Nelson's physician

assistant, to discuss the patient's mentation.  We did agree to complete a CT of
the

brain this morning after my evaluation to further characterize any changes in 
the

size of the hematoma and midline shift.  Otherwise, the patient is scheduled to 
go

to the OR later this morning with Dr. Nelson for bilateral frontal alvin holes.  
He is

to be admitted to the ICU with q.1-hour neuro checks. 



REVIEW OF SYSTEMS:  Unable to complete due to patient's condition.



PAST MEDICAL HISTORY:  Alzheimer disease, type 2 diabetes, AFib, seizure 
disorder,

hypertension, nutritional deficiency, depression, GERD, dysphagia, dementia,

hyperlipidemia, alcoholic cirrhosis of the liver without ascites, constipation. 



PAST SURGICAL HISTORY:  Nephrectomy.



SOCIAL HISTORY:  The patient lives in a nursing home.  He has severe dementia 
and is

reported to be alert and oriented x0, and it is unclear if the patient ambulates
or

not at baseline. 



MEDICATIONS:  Include:

1. Digoxin.

2. Zetia.

3. Keppra.

4. Aspirin.

5. Atenolol.

6. Remeron.

7. Depakote.

8. Simvastatin.

9. Multivitamins.

10. Namenda.

11. Aricept.

12. Prilosec.

13. Dilantin.

14. Tylenol.

15. Maalox.

16. Benadryl.

17. Robitussin.

18. Loperamide.

19. Milk of magnesia.



ALLERGIES:  NO KNOWN DRUG ALLERGIES.



PHYSICAL EXAMINATION:

VITAL SIGNS:  Temperature 98.4, pulse 90, respirations 22, oxygen saturation 96%
on

room air, blood pressure 143/78. 



PRIMARY SURVEY: 

Airway intact. 

Adequate breath sounds bilaterally. 

2+ pulses in bilateral radials, femorals, and DPs. 

GCS 9 that is eyes 2, verbal 3, motor 4.  Pupils equal, round, reactive to light

bilaterally. 

The patient has an abrasion, laceration to his left forehead.  No bleeding at 
the

time of my evaluation. 



SECONDARY SURVEY: 

HEAD:  Normocephalic.  No gross palpable skull deformities.  He has an abrasion,

laceration to his left forehead. 

EYES:  Pupils 3-2, equal, round, reactive to light bilaterally. 

ENT:  No signs of trauma. 

C-SPINE:  No step-offs or deformities.  Nontender.  C-collar in place. 

CHEST:  Nontender.  No crepitus.  No abrasions or ecchymoses.  Equal chest 
movement. 

ABDOMEN:  Soft, nontender, nondistended. 

PELVIS:  Stable to palpation.  Nontender. 

RECTAL:  Deferred. 

GENITOURINARY:  Deferred. 

EXTREMITIES:  No gross deformities.  No abrasions or ecchymoses noted.  2+ 
pulses in

bilateral radials, femorals, and DPs. 

BACK/SPINE:  No step-offs, deformities, or tenderness to palpation of thoracic 
or

lumbar spine. 

NEURO:  The patient moves all extremities spontaneously.  Gross motor intact.



LABORATORY FINDINGS:  White count 17.8, hemoglobin 12.6, hematocrit 42.1, 
platelets

328.  INR 1.1, PTT 27.6.  Sodium 144, potassium 4.3, chloride 106, bicarb 24, 
BUN 9,

creatinine 0.91, glucose 136, total bili 0.5, AST 19, ALT 13, alk phos 93.  
Troponin

less than 0.010. 



DIAGNOSTIC FINDINGS:  CT scans of the brain and C-spine have been completed as 
well

as a chest x-ray.  CT scan of the C-spine demonstrates no acute cervical spinal

abnormalities.  CT scan of the brain and chest x-ray reads are pending.  CT scan
of

the brain demonstrates obvious bilateral subdural hemorrhages, much greater on 
the

right than on the left with midline shift.  Chest x-ray upon my evaluation

demonstrates no acute cardiopulmonary disease. 



ASSESSMENT:  

1. Status post found down at nursing home, on aspirin, full-dose.

2. Bilateral subdural hemorrhages, right greater than left with midline shift.

3. History of Alzheimer disease, type 2 diabetes, atrial fibrillation, seizures,

hypertension, nutritional deficiency, depression, gastroesophageal reflux 
disease,

dysphagia, dementia, hyperlipidemia, alcoholic cirrhosis of the liver without

ascites, and constipation. 



PLAN:  The patient will be admitted to the CCU and receive q.1-hour neuro 
checks.

Head of the bed at 30 degrees.  Goal systolic blood pressure less than 160.  
Before

going upstairs, the patient to receive a repeat head CT of the brain due to drop
in

GCS.  Neurosurgery and Trauma to follow up results.  The patient is so far 
scheduled

to go to the OR later this morning for alvin hole evacuation of subdural 
hematoma.

He will be n.p.o.  He will receive normal saline at 70 an hour.  The patient 
will be

started on his home Keppra and digoxin doses, but in the IV form.  We will hold 
the

patient's aspirin.  This patient was discussed with Dr. Silva before this

dictation. 







Job ID:  [id number]



MTDD

## 2020-09-29 NOTE — RAD
KUB:

9/29/2020 at 3:39 PM



COMPARISON: 9/29/2020 2:21 PM



HISTORY: Evaluate Dobbhoff tube placement



FINDINGS: The prior study demonstrated a Dobbhoff tube within the left lower lobe. The Dobbhoff tube 
now demonstrates a normal course, extending into the left upper quadrant, likely terminating in the

region of the gastric fundus. Numerous clips are seen in the right upper quadrant. The bowel gas arcenio
lizz appears nonobstructed.



IMPRESSION: Dobbhoff tube extending into the left upper quadrant.



Reported By: Sohail Dupont 

Electronically Signed:  9/29/2020 4:09 PM

## 2020-09-30 LAB
ANION GAP SERPL CALC-SCNC: 13 MMOL/L (ref 10–20)
BASOPHILS # BLD AUTO: 0 THOU/UL (ref 0–0.2)
BASOPHILS NFR BLD AUTO: 0.2 % (ref 0–1)
BUN SERPL-MCNC: 10 MG/DL (ref 8.4–25.7)
CALCIUM SERPL-MCNC: 8.4 MG/DL (ref 7.8–10.44)
CHLORIDE SERPL-SCNC: 111 MMOL/L (ref 98–107)
CO2 SERPL-SCNC: 23 MMOL/L (ref 23–31)
CREAT CL PREDICTED SERPL C-G-VRATE: 45 ML/MIN (ref 70–130)
EOSINOPHIL # BLD AUTO: 0.1 THOU/UL (ref 0–0.7)
EOSINOPHIL NFR BLD AUTO: 0.8 % (ref 0–10)
GLUCOSE SERPL-MCNC: 108 MG/DL (ref 83–110)
HGB BLD-MCNC: 10.9 G/DL (ref 14–18)
LYMPHOCYTES # BLD: 1.3 THOU/UL (ref 1.2–3.4)
LYMPHOCYTES NFR BLD AUTO: 11.4 % (ref 21–51)
MAGNESIUM SERPL-MCNC: 1.8 MG/DL (ref 1.6–2.6)
MCH RBC QN AUTO: 28.9 PG (ref 27–31)
MCV RBC AUTO: 90.4 FL (ref 78–98)
MONOCYTES # BLD AUTO: 1.1 THOU/UL (ref 0.11–0.59)
MONOCYTES NFR BLD AUTO: 9.2 % (ref 0–10)
NEUTROPHILS # BLD AUTO: 9 THOU/UL (ref 1.4–6.5)
NEUTROPHILS NFR BLD AUTO: 78.5 % (ref 42–75)
PLATELET # BLD AUTO: 239 THOU/UL (ref 130–400)
POTASSIUM SERPL-SCNC: 3.7 MMOL/L (ref 3.5–5.1)
RBC # BLD AUTO: 3.76 MILL/UL (ref 4.7–6.1)
SODIUM SERPL-SCNC: 143 MMOL/L (ref 136–145)
WBC # BLD AUTO: 11.4 THOU/UL (ref 4.8–10.8)

## 2020-09-30 RX ADMIN — CEFAZOLIN SODIUM SCH MLS: 2 SOLUTION INTRAVENOUS at 08:17

## 2020-09-30 RX ADMIN — CEFAZOLIN SODIUM SCH MLS: 2 SOLUTION INTRAVENOUS at 16:24

## 2020-09-30 RX ADMIN — LEVETIRACETAM SCH MG: 100 SOLUTION ORAL at 20:07

## 2020-09-30 RX ADMIN — FAMOTIDINE SCH MG: 10 INJECTION, SOLUTION INTRAVENOUS at 08:20

## 2020-09-30 RX ADMIN — LEVETIRACETAM SCH MG: 100 SOLUTION ORAL at 08:17

## 2020-09-30 RX ADMIN — CEFAZOLIN SODIUM SCH MLS: 2 SOLUTION INTRAVENOUS at 23:08

## 2020-09-30 NOTE — PRG
DATE OF SERVICE:  09/30/2020



Mr. Esparza is on the first postoperative day following bilateral subdural hematoma

evacuation via frontal alvin holes bilaterally.  He is still intubated in the ICU.

Vitals have been stable.  Systolic pressures have been really low actually in the

90s, but at bedside today, it is at 124; heart rate has been in the 70s.  He has

been afebrile.  There was some minor wound drainage around the left TALIB drain

yesterday.  This has since resolved.  He had roughly 100 out on each drain

overnight.  We planned to leave these likely until tomorrow.  He continues receiving

Ancef 2 g IV q.8 hours.  This will need to continue as long as he has drains in.  He

is yet to really wake up, but there is a great likelihood that he will never become

terribly interactive given his premorbid state, and in addition, he will be

__________ by Dr. Oneill at bedside as well.  He is receiving several neuroleptics.

We will trial him off the Depakote to see if this improves some of his interactions

__________ cognitive state, but again I hope that this is rather well.  We will

check back in the morning. 







Job ID:  [id number]

## 2020-10-01 LAB
ANION GAP SERPL CALC-SCNC: 14 MMOL/L (ref 10–20)
BASOPHILS # BLD AUTO: 0 THOU/UL (ref 0–0.2)
BASOPHILS NFR BLD AUTO: 0.1 % (ref 0–1)
BUN SERPL-MCNC: 8 MG/DL (ref 8.4–25.7)
CALCIUM SERPL-MCNC: 9 MG/DL (ref 7.8–10.44)
CHLORIDE SERPL-SCNC: 112 MMOL/L (ref 98–107)
CO2 SERPL-SCNC: 23 MMOL/L (ref 23–31)
CREAT CL PREDICTED SERPL C-G-VRATE: 45 ML/MIN (ref 70–130)
EOSINOPHIL # BLD AUTO: 0 THOU/UL (ref 0–0.7)
EOSINOPHIL NFR BLD AUTO: 0.1 % (ref 0–10)
GLUCOSE SERPL-MCNC: 134 MG/DL (ref 83–110)
HGB BLD-MCNC: 11.1 G/DL (ref 14–18)
LYMPHOCYTES # BLD: 1.1 THOU/UL (ref 1.2–3.4)
LYMPHOCYTES NFR BLD AUTO: 7.7 % (ref 21–51)
MAGNESIUM SERPL-MCNC: 2.4 MG/DL (ref 1.6–2.6)
MCH RBC QN AUTO: 28.4 PG (ref 27–31)
MCV RBC AUTO: 89.4 FL (ref 78–98)
MONOCYTES # BLD AUTO: 1.1 THOU/UL (ref 0.11–0.59)
MONOCYTES NFR BLD AUTO: 7.7 % (ref 0–10)
NEUTROPHILS # BLD AUTO: 11.8 THOU/UL (ref 1.4–6.5)
NEUTROPHILS NFR BLD AUTO: 84.4 % (ref 42–75)
PLATELET # BLD AUTO: 287 THOU/UL (ref 130–400)
POTASSIUM SERPL-SCNC: 3.9 MMOL/L (ref 3.5–5.1)
RBC # BLD AUTO: 3.91 MILL/UL (ref 4.7–6.1)
SODIUM SERPL-SCNC: 145 MMOL/L (ref 136–145)
WBC # BLD AUTO: 14 THOU/UL (ref 4.8–10.8)

## 2020-10-01 RX ADMIN — FAMOTIDINE SCH MG: 10 INJECTION, SOLUTION INTRAVENOUS at 08:39

## 2020-10-01 RX ADMIN — LEVETIRACETAM SCH MG: 100 SOLUTION ORAL at 21:29

## 2020-10-01 RX ADMIN — DIVALPROEX SODIUM SCH: 500 TABLET, DELAYED RELEASE ORAL at 09:56

## 2020-10-01 RX ADMIN — LEVETIRACETAM SCH MG: 100 SOLUTION ORAL at 08:39

## 2020-10-01 RX ADMIN — CEFAZOLIN SODIUM SCH MLS: 2 SOLUTION INTRAVENOUS at 08:39

## 2020-10-01 RX ADMIN — DIVALPROEX SODIUM SCH MG: 500 TABLET, DELAYED RELEASE ORAL at 21:29

## 2020-10-01 RX ADMIN — DIVALPROEX SODIUM SCH MG: 500 TABLET, DELAYED RELEASE ORAL at 15:26

## 2020-10-01 RX ADMIN — CEFAZOLIN SODIUM SCH MLS: 2 SOLUTION INTRAVENOUS at 15:26

## 2020-10-01 NOTE — PRG
DATE OF SERVICE:  10/01/2020



SUBJECTIVE:  The patient was seen with Dr. Oneill during morning rounds in the

critical care unit.  The patient is currently awake, alert and following 
commands.

The patient was extubated yesterday and has not had any overnight events.  
Speech

therapy is currently at bedside and patient is able to swallow, but is slow to

respond.  Patient's GCS is baseline, E4 V4 M6 for a total 14.  All of the 
patient's

head drains are out.  The patient remains on Ancef at this time per 
Neurosurgery.

The patient is able to cough deeply and is not requiring any oxygen.  The 
patient's

Dobhoff was removed. 



OBJECTIVE:  VITAL SIGNS:  Temperature 99.2, blood pressure 159/93, respirations 
20,

SpO2 of 98% on room air, pulse 76. 

GENERAL:  Elderly male, awake, alert, sitting up in hospital bed, in no acute

distress, GCS 14, which is baseline, moves all extremities. 

HEENT:  Staples in place to the head.  No signs of drainage or redness or 
infection. 

RESPIRATORY:  Bilateral breath sounds clear, no wheezing, rales, or rhonchi, no

respiratory distress. 

CARDIAC:  Regular rate, regular rhythm. 

ABDOMEN:  Soft, nontender, nondistended.   

EXTREMITIES:  Moves all extremities, neurovascularly intact x4.



LABORATORY DATA:  WBC 14.0, RBC 3.91, hemoglobin 11.1, hematocrit 35.0, 
platelets

287.  Sodium 145, potassium 3.9, chloride 112, BUN 8, creatinine 0.81, estimated
GFR

greater than 90, glucose 134, calcium 9.0, phosphorus 2.8, magnesium 2.4. 



DIAGNOSTICS:  No new diagnostics to review today.



ASSESSMENT:  

1. Status post ground level fall, unwitnessed, with delayed presentation.

2. Bilateral subdural hematomas, right greater than left with midline shift, 
left

being acute on chronic and right being chronic, stable. 

3. Forehead laceration repaired.

4. History of Alzheimer's, type 2 diabetes, atrial fibrillation, seizures,

hypertension, nutritional deficiency, depression, gastroesophageal reflux 
disease,

dysphagia, dementia, hyperlipidemia, alcohol cirrhosis with ascites and

constipation. 

5. Status post bilateral alvin hole placement, drains discontinued bilateral.



PLAN:  Continue supportive care.  Increase diet to full puree as tolerated.

Continue to have Speech Therapy to see patient.  Physical and Occupational 
therapy.

We will move the patient to the regular floor.  Continue Ancef per Neurosurgery.

Anticipate the patient going back to the nursing home in the next 24 to 48 
hours.

Again, the patient was examined by Dr. Oneill during morning rounds. 







Job ID:  [id number]



[email or web address]

## 2020-10-01 NOTE — PRG
DATE OF SERVICE:  10/01/2020



Mr. Esparza is on the second day of his hospital stay and is postoperative day 2

following bilateral alvin holes and subdural hematoma evacuation.  Drains of both TALIB

drains tapered overnight.  We will remove this morning.  He tolerated his procedure

well and extubated yesterday and overall looks to be much better.  He is somewhat

interactive, follows simple commands.  He continually tells me that he __________

very quick to tell me that I hurt him when I stapled one of the drain holes close to

the left frontal scalp.  He definitely is more vigorous and lively than upon

presentation.  From Neurosurgery standpoint, he can be transferred to the floor at

any time at Trauma team's preference.  Neurosurgery will continue to loosely follow

along during the course of his hospital stay. 







Job ID:  [id number]

## 2020-10-01 NOTE — PRG
DATE OF SERVICE:  09/30/2020



Mr. Esparza is now one day status post bilateral frontal alvin hole, evacuation of

chronic subdural hematomas.  He is in the ICU this morning and remains intubated.

He has two subdural drains and with significant output in each. 



He does open his eyes, but is minimally interactive.  Based upon my conversations

with his caregivers, this is very near to his baseline.  He can be extubated at any

time from a neurosurgical perspective.  We will continue to follow along.  The

drains will likely be removed tomorrow. 







Job ID:  [id number]

## 2020-10-02 VITALS — DIASTOLIC BLOOD PRESSURE: 84 MMHG | TEMPERATURE: 97.1 F | SYSTOLIC BLOOD PRESSURE: 152 MMHG

## 2020-10-02 RX ADMIN — DIVALPROEX SODIUM SCH MG: 500 TABLET, DELAYED RELEASE ORAL at 10:05

## 2020-10-02 RX ADMIN — LEVETIRACETAM SCH MG: 100 SOLUTION ORAL at 10:09

## 2020-10-02 RX ADMIN — DIVALPROEX SODIUM SCH MG: 500 TABLET, DELAYED RELEASE ORAL at 16:30

## 2020-10-03 NOTE — DIS
DATE OF ADMISSION:  09/29/2020



DATE OF DISCHARGE:  10/02/2020



ADMISSION DIAGNOSES:  

1. Status post fall, reportedly found down at nursing home.

2. Bilateral subdural hemorrhages, right greater than left, both with chronic

appearance, left with an acute-on-chronic appearance. 

3. History of Alzheimer disease, type 2 diabetes, atrial fibrillation, seizures,

hypertension, nutritional deficiency, depression, gastroesophageal reflux disease,

dysphagia, dementia, hyperlipidemia, alcoholic cirrhosis of the liver without

ascites. 



CONSULTATION:  Neurosurgery, Dr. Nelson.



PROCEDURE:  Bilateral frontal alvin hole placement with evacuation of hematoma.



SUMMARY:  The patient is a 75-year-old man who was brought to our facility as a

transfer from the emergency department in [place].  The patient had history of

reportedly falling from his bed at approximately 3:00 p.m. during the afternoon.  He

was then noted to have later that evening blood on his pillow.  He was taken to the

emergency department there where he underwent evaluation and examination, was noted

to have bilateral subdural hematomas with the left having acute-on-chronic

appearance.  The patient was brought to our facility where he underwent evaluation

and examination and the following morning, underwent his alvin hole placement.  The

patient reportedly was a difficult intubation and due to the timing of him getting

to the critical care unit postoperatively, it was felt that it was the safest to

allow the patient to be on the ventilator overnight.  The following morning, the

patient was able to be extubated and he would awaken and eventually return to his

baseline in mentation.  At the time of discharge, his Ne Coma Scale was 14, -1

for confusion, which is his baseline.  He was tolerating a diet.  His pain was

controlled.  He had been resumed on all of his home medications.  He will follow up

with Dr. Nelson in 10 days for his staple removal, sooner as needed.  He may follow

up with the [facility]uma [facility] as needed and he will follow up with his primary care

provider as previously directed. 





Job ID:  [id number]

## 2020-10-04 ENCOUNTER — HOSPITAL ENCOUNTER (EMERGENCY)
Dept: HOSPITAL 92 - ERS | Age: 75
Discharge: NURSING, REHAB OR LONG-TERM CARE | End: 2020-10-04
Payer: MEDICARE

## 2020-10-04 DIAGNOSIS — I10: ICD-10-CM

## 2020-10-04 DIAGNOSIS — E11.9: ICD-10-CM

## 2020-10-04 DIAGNOSIS — F41.9: ICD-10-CM

## 2020-10-04 DIAGNOSIS — E78.5: ICD-10-CM

## 2020-10-04 DIAGNOSIS — G40.909: ICD-10-CM

## 2020-10-04 DIAGNOSIS — K21.9: ICD-10-CM

## 2020-10-04 DIAGNOSIS — I48.91: ICD-10-CM

## 2020-10-04 DIAGNOSIS — Z79.899: ICD-10-CM

## 2020-10-04 DIAGNOSIS — F32.9: ICD-10-CM

## 2020-10-04 DIAGNOSIS — F25.9: ICD-10-CM

## 2020-10-04 DIAGNOSIS — I62.03: Primary | ICD-10-CM

## 2020-10-04 PROCEDURE — 51702 INSERT TEMP BLADDER CATH: CPT

## 2020-10-04 PROCEDURE — 36415 COLL VENOUS BLD VENIPUNCTURE: CPT

## 2020-10-04 PROCEDURE — 80177 DRUG SCRN QUAN LEVETIRACETAM: CPT

## 2020-10-05 NOTE — CON
DATE OF CONSULTATION:  10/04/2020



CHIEF COMPLAINT:  Altered mental status.



HISTORY OF PRESENT ILLNESS:  Mr. Esparza is a 75-year-old gentleman.  Today, he 
had

some worsening altered mental status at his rehab facility in [place].  On 
09/28,

the patient had a fall with bilateral right greater than left subdural hematoma 
with

an 11 mm midline shift.  He had bilateral alvin holes on 09/29 by Dr. Nelson.  
Today,

there was some concern due to his altered mental status and a head CT was done 
and

showed bilateral subdural hematomas with no midline shift and of course 
bilateral

alvin holes.  His scan today is much improved from his preop scan.  The emergency


department has rulled out AMS for urinary tract infection and hyponatremia. Mr. Esparza

does have Alzheimer's and baseline of dementia, which makes his worsening 
altered

mental status difficult to determine.  I placed a call and spoke to his nurse at
his

rehab facility in [place].  While on the phone, spoke to the patient and during
his

exam, his nurse stated he is doing much better and he is now at his baseline. 



REVIEW OF SYSTEMS:  Unable to determine review of systems due to mental status 
of

dementia. 



PAST MEDICAL HISTORY:  Rectal polyps, cardiac history of arrhythmia, atrial

fibrillation, type 2 diabetes, cirrhosis, gastroesophageal reflux disease,

hyperlipidemia, hypertension, Alzheimer's, epilepsy, and subdural hematoma. 



PSYCHIATRIC HISTORY:  Depression, anxiety, schizoaffective disorder.



SOCIAL HISTORY:  The patient denies smoking, illicit drugs, or alcohol use.



CURRENT MEDICATIONS:  

1. Atenolol 50 mg.

2. Keppra 1000 mg.

3. Ezetimibe 10 mg.

4. Digoxin 125 mcg.

5. Phenytoin 100 mg 4 mL.

6. Prilosec 20 mg.

7. Aricept 10 mg.

8. Memantine 10 mg.

9. Multivitamin.

10. Zocor 40 mg.

11. Depakote 500 mg.

12. Mirtazapine 7.5 mg.



PHYSICAL EXAMINATION:

VITAL SIGNS:  /89, pulse 89, respiratory rate 18, temperature 98.6. 

HEENT:  Pupils are equal.  Extraocular movements are intact. 

NECK:  Soft, supple.  No masses are noted.  Range of motion is intact and

nonpainful. 

NEUROLOGICAL:  He is awake.  He has spontaneous eye opening, verbal response,

confused.  Motor response; obeys commands, simple commands.  He wiggles his toes
and

he squeezed by my hands with good bilateral  strength.  He has free active 
range

of motion of all his extremities.  He has no focal motor weakness.  No reflex

asymmetry. 



LABORATORY DATA:  WBC 7.7, platelets 258.  PT 10.9, INR 1.0, PTT 26.4, sodium 
146.

Urine nitrite is negative.  No white blood cells or bacteria were seen in the 
urine.

 Head CT, bilateral subdural hematomas present with bilateral alvin holes with no

midline shift. 



ASSESSMENT:  

1. Alzheimer's.

2. Dementia.



PLAN:  No indication of urinary tract infection, hyponatremia or residual blood.

There is no recurrent or larger subdural hematoma.  He is taking Keppra 1000 mg

b.i.d., recommend a lower dose of Keppra 250 mg b.i.d.  The emergency room is 
going

to get a Keppra level and adjust accordingly.  No need to admit the patient.  
Follow

up with his surgeon for removal of staples in 7 to 10 days. 







Job ID:  [id number]



MTDD

## 2020-10-06 NOTE — PQF
CLINICAL DOCUMENTATION CLARIFICATION FORM:







Dear:    SANJAY Mays PA-C                   Date / Time: 10/6/20. 8611



Please exercise your independent, professional judgment in responding to the 
clarification form. Clinical indicators are provided on the bottom of this form 
for your review.



Please check appropriate box(es):



[  ] Cerebral edema / Vasogenic edema     [  ] Compression of brain 

         Due to:

       

              [  ] Bileteral subdural hemorrhages  



[  ] Other diagnosis ___________

[  ] Unable to determine



In addition, please specify:

Present on Admission (POA):  [  ] Yes             [  ] No             [  ] 
Unable to determine



For continuity of documentation, please document condition throughout progress 
notes and discharge summary.  Thank You.

To be completed by CDI/Coding staff for physician review: 



CLINICAL INDICATORS - SIGNS / SYMPTOMS / LABS  / RESULTS AND LOCATION IN EMR



 Brain CT : Stable mass effect on the right cerebral hemisphere Midline shift 
measured approximately 8-9mm (9/29/20)



 CT scan at [facility] reveals profoundly large bilateral subdural 
hematomas, right greater than the left with extraordinary compression of the 
underlying brain parenchyma. Again, the distortion and the degree of compression
to the brain is rather profound. ( Consult/Cain //  9/29/20)



 : Found down by nursing home with laceration / abrasion to left forehead. Upon
my evaluation, the patients neurological status had declined from what was 
reported to me by the resident physician, who contacted me to admit the pt. ( 
H&P/ Amy //9/30/20)



* Bilateral subdural hematomas, right greater than left with midline shift, left
being acute on chronic and right being chronic, stable.  ( Pn / Mae 
//10/1/20)





RISK FACTORS /   RESULTS AND LOCATION IN EMR



  status post fall, found down; bilateral subdural hemorrhages (  Discharge 
Summary/ Davon //10/2/20 ) 



TREATMENTS /  RESULTS AND LOCATION IN EMR



 Neurosurgery consult ( [place]/9/29)



 Bilateral Manlius hole placement w evacuation of hematoma (9/29)









Thank You! 





CDS Signature: Corinna Dsouza RN   Phone #:  [phone]   Date: 10/6/2020



This is a permanent part of the medical record 

[facility]

## 2020-11-12 ENCOUNTER — HOSPITAL ENCOUNTER (INPATIENT)
Dept: HOSPITAL 92 - ERS | Age: 75
LOS: 5 days | Discharge: NURSING, REHAB OR LONG-TERM CARE | DRG: 65 | End: 2020-11-17
Attending: SURGERY | Admitting: SURGERY
Payer: MEDICARE

## 2020-11-12 VITALS — BODY MASS INDEX: 15 KG/M2

## 2020-11-12 DIAGNOSIS — R13.10: ICD-10-CM

## 2020-11-12 DIAGNOSIS — F25.9: ICD-10-CM

## 2020-11-12 DIAGNOSIS — Z23: ICD-10-CM

## 2020-11-12 DIAGNOSIS — S01.01XA: ICD-10-CM

## 2020-11-12 DIAGNOSIS — G30.9: ICD-10-CM

## 2020-11-12 DIAGNOSIS — E11.9: ICD-10-CM

## 2020-11-12 DIAGNOSIS — Z91.14: ICD-10-CM

## 2020-11-12 DIAGNOSIS — F41.9: ICD-10-CM
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
## 2020-11-12 DIAGNOSIS — K59.00: ICD-10-CM

## 2020-11-12 DIAGNOSIS — F32.9: ICD-10-CM

## 2020-11-12 DIAGNOSIS — K21.9: ICD-10-CM

## 2020-11-12 DIAGNOSIS — Z90.5: ICD-10-CM

## 2020-11-12 DIAGNOSIS — E44.0: ICD-10-CM

## 2020-11-12 DIAGNOSIS — G40.909: ICD-10-CM

## 2020-11-12 DIAGNOSIS — E87.5: ICD-10-CM

## 2020-11-12 DIAGNOSIS — I62.03: Primary | ICD-10-CM

## 2020-11-12 DIAGNOSIS — Z79.899: ICD-10-CM

## 2020-11-12 DIAGNOSIS — F02.80: ICD-10-CM

## 2020-11-12 DIAGNOSIS — Z79.82: ICD-10-CM

## 2020-11-12 DIAGNOSIS — K70.30: ICD-10-CM

## 2020-11-12 DIAGNOSIS — E78.5: ICD-10-CM

## 2020-11-12 DIAGNOSIS — I48.91: ICD-10-CM

## 2020-11-12 DIAGNOSIS — Z20.828: ICD-10-CM

## 2020-11-12 DIAGNOSIS — I10: ICD-10-CM

## 2020-11-12 LAB
DIGOXIN SERPL-MCNC: (no result) NG/ML (ref 0.8–2)
PROT UR STRIP.AUTO-MCNC: 10 MG/DL
SP GR UR STRIP: 1.02 (ref 1–1.04)

## 2020-11-12 PROCEDURE — 80162 ASSAY OF DIGOXIN TOTAL: CPT

## 2020-11-12 PROCEDURE — 85025 COMPLETE CBC W/AUTO DIFF WBC: CPT

## 2020-11-12 PROCEDURE — 80185 ASSAY OF PHENYTOIN TOTAL: CPT

## 2020-11-12 PROCEDURE — 93005 ELECTROCARDIOGRAM TRACING: CPT

## 2020-11-12 PROCEDURE — 85730 THROMBOPLASTIN TIME PARTIAL: CPT

## 2020-11-12 PROCEDURE — 90471 IMMUNIZATION ADMIN: CPT

## 2020-11-12 PROCEDURE — 96374 THER/PROPH/DIAG INJ IV PUSH: CPT

## 2020-11-12 PROCEDURE — 36416 COLLJ CAPILLARY BLOOD SPEC: CPT

## 2020-11-12 PROCEDURE — 70450 CT HEAD/BRAIN W/O DYE: CPT

## 2020-11-12 PROCEDURE — G0378 HOSPITAL OBSERVATION PER HR: HCPCS

## 2020-11-12 PROCEDURE — 80048 BASIC METABOLIC PNL TOTAL CA: CPT

## 2020-11-12 PROCEDURE — 36415 COLL VENOUS BLD VENIPUNCTURE: CPT

## 2020-11-12 PROCEDURE — 84484 ASSAY OF TROPONIN QUANT: CPT

## 2020-11-12 PROCEDURE — U0003 INFECTIOUS AGENT DETECTION BY NUCLEIC ACID (DNA OR RNA); SEVERE ACUTE RESPIRATORY SYNDROME CORONAVIRUS 2 (SARS-COV-2) (CORONAVIRUS DISEASE [COVID-19]), AMPLIFIED PROBE TECHNIQUE, MAKING USE OF HIGH THROUGHPUT TECHNOLOGIES AS DESCRIBED BY CMS-2020-01-R: HCPCS

## 2020-11-12 PROCEDURE — 90662 IIV NO PRSV INCREASED AG IM: CPT

## 2020-11-12 PROCEDURE — 87635 SARS-COV-2 COVID-19 AMP PRB: CPT

## 2020-11-12 PROCEDURE — 80164 ASSAY DIPROPYLACETIC ACD TOT: CPT

## 2020-11-12 PROCEDURE — 72125 CT NECK SPINE W/O DYE: CPT

## 2020-11-12 PROCEDURE — 90732 PPSV23 VACC 2 YRS+ SUBQ/IM: CPT

## 2020-11-12 PROCEDURE — 81003 URINALYSIS AUTO W/O SCOPE: CPT

## 2020-11-12 PROCEDURE — 82550 ASSAY OF CK (CPK): CPT

## 2020-11-12 PROCEDURE — G0008 ADMIN INFLUENZA VIRUS VAC: HCPCS

## 2020-11-12 PROCEDURE — 80053 COMPREHEN METABOLIC PANEL: CPT

## 2020-11-12 PROCEDURE — 51701 INSERT BLADDER CATHETER: CPT

## 2020-11-12 PROCEDURE — 71045 X-RAY EXAM CHEST 1 VIEW: CPT

## 2020-11-12 PROCEDURE — 96375 TX/PRO/DX INJ NEW DRUG ADDON: CPT

## 2020-11-12 PROCEDURE — 85610 PROTHROMBIN TIME: CPT

## 2020-11-12 PROCEDURE — G0009 ADMIN PNEUMOCOCCAL VACCINE: HCPCS

## 2020-11-12 PROCEDURE — 83880 ASSAY OF NATRIURETIC PEPTIDE: CPT

## 2020-11-12 PROCEDURE — G0390 TRAUMA RESPONS W/HOSP CRITI: HCPCS

## 2020-11-12 RX ADMIN — DIVALPROEX SODIUM SCH MG: 500 TABLET, DELAYED RELEASE ORAL at 21:49

## 2020-11-12 NOTE — HP
REQUESTING PHYSICIAN:  Miki Chamberlain MD



ATTENDING SURGEON:  Daron Oneill DO



CONSULTATIONS:  Neurosurgery, Dr. Soto.



HISTORY OF PRESENT ILLNESS:  The patient is a 75-year-old man who was brought to
the

emergency department as a transfer.  [facility] at the [facility]

[facility] when he reportedly had a seizure, which the patient does have a seizure

disorder.  He reportedly fell and was taken to the [facility], 
where

his CT showed large subdural hematomas bilaterally, chronic in nature, with tiny

suspected acute component also.  The patient was then transferred to our 
facility

for neurosurgical evaluation and evaluation for admission.  The patient had a

previous history of alvin holes on 09/29/2020.  The patient was reportedly at the

baseline for his mentation right now, though he has required Haldol x2 once at 
each

hospital for becoming aggressive with the nurses.  The patient was transferred 
to

our facility with what appears to be partial records from the nursing facility. 
He

is an extremely poor historian and we have not been able to contact family as of

yet.  The remainder of his information is gathered from his prior admission in

September. 



ALLERGIES:  NONE.



CURRENT MEDICATIONS:  Need to be updated.

1. Digoxin.

2. Zetia.

3. Keppra.

4. Aspirin.

5. Atenolol.

6. Remeron.

7. Depakote.

8. Simvastatin.

9. Multivitamin.

10. Namenda.

11. Aricept.

12. Prilosec.

13. Dilantin.

14. Tylenol.

15. Maalox.

16. Benadryl.

17. Robitussin.

18. Loperamide.

19. Milk of magnesia.



PAST MEDICAL HISTORY:  Alzheimer's, type 2 diabetes, atrial fibrillation, 
seizure

disorder, hypertension, nutritional deficiency, depression, gastroesophageal 
reflux

disease, dysphagia, dementia, hyperlipidemia, alcoholic cirrhosis of the liver

without ascites, constipation. 



PAST SURGICAL HISTORY:  Nephrectomy, bilateral alvin hole placement.



SOCIAL HISTORY:  The patient currently resides in the [facility]

Facility.  His baseline is A and O x0 and he reportedly ambulates primarily by

wheelchair, but is able to do some transfers.  There is no reported history of

drugs, tobacco, or alcohol use. 



REVIEW OF SYSTEMS:  Ten-point review of systems is negative as otherwise stated.



PHYSICAL EXAMINATION:

VITAL SIGNS:  Blood pressure 130/84, heart rate 98, respirations 19, oxygen

saturation 99% on room air, and temperature is 98.5. 

GENERAL:  The patient is resting comfortably in bed.  At the time of my visit, 
his

Ne Coma Scale was 14.  It is -1 for confusion. 

HEENT:  Head is normocephalic.  The patient has well-healed alvin hole site on 
the

left.  The right-sided one has a small abrasion over the top of it, but the skin

beneath it appears intact.  Eyes are PERRLA.  The patient does not follow my

instructions for extraocular motion.  Ears are atraumatic without discharge.  
Nose

is atraumatic without discharge.  Oropharynx is clear. 

NECK:  The patient is moving his head freely.  His trachea is midline.  There is
no

JVD. 

CHEST:  Clear to auscultation with moderate inspiratory and expiratory effort. 

HEART:  Regular rate and rhythm. 

ABDOMEN:  Soft and nontender with hypoactive bowel sounds. 

PELVIS:  Stable. 

EXTREMITIES:  Neurovascularly intact x4.  The patient is moving all 4 
extremities

freely. 



LABORATORY FINDINGS:  White blood cell count 12.8, hemoglobin 11.3, hematocrit 
37.3,

platelets 364.  Sodium 139, potassium 5.8, chloride 106, CO2 of 15, BUN 8,

creatinine 0.87, glucose 108.  LFTs are unremarkable.  PT 11, INR 1.1, PTT 29. 



RADIOGRAPHIC FINDINGS:  CT of the brain without contrast shows an increasing

right-sided subdural collection.  There is marked mass effect on the right 
cerebrum.

 There is right-to-left subfalcine herniation.  There are bilateral subdural

collections, which are most consistent with chronic subdural hematoma/subdural

hygromas.  CT of the C-spine without contrast shows no fracture, stable 
multilevel

degenerative changes and a possible posttraumatic edema involving the left neck

overlying the sternocleidomastoid muscle.  AP chest x-ray shows no acute 
findings. 



ASSESSMENT AND PLAN:  

1. Status post seizure, known seizure disorder.

2. Bilateral subdural hematomas, chronic with suspected acute process.

3. Hyperkalemia.

4. History of hypertension, seizure disorder, dysphagia, type 2 diabetes, alvin 
hole

placement in September, alcoholic cirrhosis of the liver without ascites, 
frequent

constipation. 



Plan will be to admit the patient to the [facility] for frequent neurologic exams.

Neurosurgery plans on contacting the family and discussing surgical options.  
The

patient is a full code.  This will be taken into consideration.  Surgically, the

family is not contacted in their patients who require surgery.  We will check 
blood

levels of his antiepileptics.  We will also repeat his potassium.  The patient 
will

have pain control, pulmonary toilet, gastritis and mechanical VTE prophylaxis.  
The

evaluation, examination, laboratory, and radiographic findings were discussed 
with

Dr. Oneill prior to this dictation.  Neurosurgery plans on evaluating the patient

here in the Emergency Department. 







Job ID:  [id number]



MTDD

## 2020-11-12 NOTE — RAD
Exam: Chest one view



HISTORY:Seizure.



Comparison: 10/4/2020



FINDINGS:

Cardiac silhouette: Normal

Aorta: Unremarkable

Pulmonary vessels: Normal

Costophrenic angles: 6



LUNGS: Hyperinflated with chronic changes. No consolidation or mass.

Pneumothorax: None



Osseous abnormalities: None



IMPRESSION: No acute cardiopulmonary process.



Reported By: Елена Khan 

Electronically Signed:  11/12/2020 1:11 PM

## 2020-11-13 LAB
ANION GAP SERPL CALC-SCNC: 13 MMOL/L (ref 10–20)
BASOPHILS # BLD AUTO: 0.1 THOU/UL (ref 0–0.2)
BASOPHILS NFR BLD AUTO: 0.6 % (ref 0–1)
BUN SERPL-MCNC: 6 MG/DL (ref 8.4–25.7)
CALCIUM SERPL-MCNC: 9.5 MG/DL (ref 7.8–10.44)
CHLORIDE SERPL-SCNC: 107 MMOL/L (ref 98–107)
CO2 SERPL-SCNC: 22 MMOL/L (ref 23–31)
CREAT CL PREDICTED SERPL C-G-VRATE: 49 ML/MIN (ref 70–130)
EOSINOPHIL # BLD AUTO: 0.1 THOU/UL (ref 0–0.7)
EOSINOPHIL NFR BLD AUTO: 0.7 % (ref 0–10)
GLUCOSE SERPL-MCNC: 86 MG/DL (ref 83–110)
HGB BLD-MCNC: 11.1 G/DL (ref 14–18)
LYMPHOCYTES # BLD: 2.4 THOU/UL (ref 1.2–3.4)
LYMPHOCYTES NFR BLD AUTO: 20.6 % (ref 21–51)
MCH RBC QN AUTO: 27.5 PG (ref 27–31)
MCV RBC AUTO: 87.4 FL (ref 78–98)
MONOCYTES # BLD AUTO: 1 THOU/UL (ref 0.11–0.59)
MONOCYTES NFR BLD AUTO: 8.8 % (ref 0–10)
NEUTROPHILS # BLD AUTO: 8.2 THOU/UL (ref 1.4–6.5)
NEUTROPHILS NFR BLD AUTO: 69.2 % (ref 42–75)
PLATELET # BLD AUTO: 322 THOU/UL (ref 130–400)
POTASSIUM SERPL-SCNC: 4.5 MMOL/L (ref 3.5–5.1)
RBC # BLD AUTO: 4.03 MILL/UL (ref 4.7–6.1)
SODIUM SERPL-SCNC: 137 MMOL/L (ref 136–145)
WBC # BLD AUTO: 11.8 THOU/UL (ref 4.8–10.8)

## 2020-11-13 RX ADMIN — DIVALPROEX SODIUM SCH MG: 500 TABLET, DELAYED RELEASE ORAL at 22:14

## 2020-11-13 RX ADMIN — DIVALPROEX SODIUM SCH MG: 500 TABLET, DELAYED RELEASE ORAL at 15:25

## 2020-11-13 RX ADMIN — DIVALPROEX SODIUM SCH MG: 500 TABLET, DELAYED RELEASE ORAL at 08:58

## 2020-11-13 NOTE — PRG
DATE OF SERVICE:  11/13/2020



The patient was seen and examined, I agree with Karen Verdugo's evaluation on

11/13/2020.  The patient is a 75-year-old male with Alzheimer dementia in a nursing

home.  He has no reachable family.  He was treated for bilateral chronic subdural

hematomas by surgical evacuation last month and has developed recurrence.  However,

he is at his neurologic baseline and had a seizure yesterday with a fall from his

chair.  There does not seem to be any deviation from his neurologic baseline. 



The fundamental issue here is that his brain is not re-expanded following the

evacuation of the subdural hematoma, it is related to loss of brain volume and

atrophy.  Given this as well as his baseline poor neurologic function, I do not

recommend any further surgical efforts in his care.  I believe that he could safely

be discharged to his nursing home and discussed this with the trauma team.  No

specific followup was recommended. 







Job ID:  [id number]

## 2020-11-13 NOTE — CON
DATE OF CONSULTATION:  11/12/2020



HISTORY OF PRESENT ILLNESS:  The patient is a 75-year-old  male 
with

a history of Alzheimer disease, atrial fibrillation, previously on aspirin prior
to

his subdural hematoma evacuation, type 2 diabetes, seizure disorder, 
hypertension,

alcoholic cirrhosis, who is known to the Neurosurgery Service, Dr. Nelson's team 
for

evaluation of bilateral chronic bifrontal subdural hematoma approximately on 
09/29/2020.  Following the surgery, he

had improvement in bilateral subdurals on followup CT and remained 
neurologically at

baseline. He transitioned back to his home at the [facility].  The 
patient

was at his baseline, but earlier today, he reportedly had a seizure event with 
fall. 

He was taken to the [facility] where he was

evaluated with a new CT head.  New CT head today shows a very large right-sided

chornic subdural hematoma with mass effect and shift from

right to left.  Neurosurgery was consulted for further evaluation and management
of

this subdural hematoma.  In addition, during his evaluation, he was found to be

subtherapeutic on his seizure medications, phenytoin and valproic acid.

Platelets and coags were wnl.  I visited the patient

at the bedside in the emergency department.  He is sitting up in bed and asking 
for

something to eat and also blanket. AOX1. 



PHYSICAL EXAMINATION:

GENERAL:  He appears to be in no acute distress.  He is somewhat anxious and

impulsive and trying to get out of bed and fussing with his blankets. 

HEENT:  Head, he has some bifrontal subdural alvin hole incisions.  These appear 
to

be healing well.  No evidence of redness, swelling, or signs of infection.  No

obvious signs of new trauma.  Eyes, pupils are equal and reactive to light.

Extraocular movements intact.  ENT:  Oral mucosa is pink, intact.  He has normal

voice. 

NECK:  He is moving his neck without difficulty and does not appear to be in any

pain or have any nuchal rigidity. 

CARDIAC:  Regular rate and rhythm. 

PULMONARY:  He has symmetric chest expansion.  No evidence of any dyspnea.

MUSCULOSKELETAL:  There are no obvious deformities in the extremities.  He is 
moving

all 4s without difficulty. 

NEUROLOGIC:  He is only oriented to person.  He is somewhat confused, but this

appears to be consistent with his baseline.  He is moving all 4s without 
difficulty

with good strength throughout and appears to have an otherwise grossly nonfocal

neuro exam. 



ASSESSMENT AND PLAN:  This is a 75-year-old male with Alzheimer's dementia, 
seizure

disorder, and atrial fibrillation, previously on aspirin with recurrent chronic 
large right SDH.  We will monitor him

closely overnight, but he appears to be at his baseline neurologically.  I 
discussed

this case with Dr. Soto who feels that further attempts at subdural hematoma

evacuation would not be recommended at this time considering the patient's age,

comorbidities and likely low probability of re-expansion given failure of prior 
bifrontal SDH evac.  

He should continue to be off any anticoagulation indefinitely.  We

feel Palliative care consult would be beneficial.  I did eventually make contact
with a daughter who had not seen her father in several months and did not know 
he had previously had surgery 1 month ago. She does not no his wishes or 
directives. 

Additionally during his stay, we should titrate his seizure medications to 
prevent

any further seizure episodes.  No further followup imaging or neurosurgical

intervention is planned.  We feel that he could be transitioned back to the 
nursing

home once his seizure medications are adjusted.  Please reach out to 
Neurosurgery

for any additional questions or concerns. 







Job ID:  [id number]



[facility]

## 2020-11-13 NOTE — RAD
PORTABLE CHEST:

 

Date:  11/13/2020

 

COMPARISON:  

Prior day's study. 

 

HISTORY:  

Follow-up of possible aspiration. 

 

FINDINGS:

Heart size is within normal limits. Aorta is tortuous. Lungs appear somewhat hyperexpanded. Some pare
nchymal change in the right base has the appearance of scar. Blunting to the left costophrenic angle 
is felt to be chronic in nature. 

 

IMPRESSION: 

Stable exam. 

 

 

POS: FILIPPO

## 2020-11-13 NOTE — PRG
DATE OF SERVICE:  11/13/2020



This is Prasad Connelly PA-C dictating a report for Daron Oneill DO.



The patient was evaluated and discussed by Dr. Daron Oneill.



SUBJECTIVE:  Mr. Esparza was admitted overnight, status post seizure, chronic

subdural hematoma, evaluated by Neurosurgery.  No further intervention.  The 
patient

is at his neuro baseline.  No further seizures overnight.  Of note, all of his

antiepileptic levels were subtherapeutic and discussing with [facility],

apparently the patient feeds himself, however, he nearly aspirated in the 
emergency

department on a sandwich here and choked and got a speech therapy eval shows 
that he

needs modified diet same as he was on here before with monitoring.  During 
eating,

he was able to take his antiepileptics today.  He remains hemodynamically 
stable.

Neurosurgery has cleared the patient to release back to the nursing home.  Case

management was consulted and are trying to get a hold of the family. 



We will discuss with the case management, who is going to talk to the family to

evaluate for their goals of care including they want to proceed with PEG tube or

release back to the nursing home with close monitoring or consideration of 
hospice.

I have offered to make the same phone call. 



OBJECTIVE:  VITAL SIGNS:  Today; temperature is 97.5, blood pressure 120/73, 
heart

rate is 89, respiratory rate is 16, 98% on room air. 

GENERAL:  A 75-year-old thin, frail male, in recumbent position, just asking for

coffee.  He is confused at times. 

HEENT:  Normocephalic, atraumatic.  No obvious injury.  Trachea is midline. 

RESPIRATORY:  Equal rise and fall.  Bilateral breath sounds clear. 

CARDIOVASCULAR:  Regular rate.  Strong pulses. 

ABDOMEN:  Soft. 

MUSCULOSKELETAL:  Moves extremities. 

PSYCH:  Impulsive.  Just asking for coffee. 

NEURO:  GCS 14 for confusion, but he does move his extremities well.  No further

seizures are reported. 



LABORATORY DATA:  Today; white blood count is 11.8, platelets 322, hemoglobin 
and

hematocrit are 11.1 and 35.5 respectively.  Sodium is 137, potassium 4.5, 
chloride

is 107, CO2 is 22, creatinine 0.75.  Initial troponin was 0.01.  Dig is less 
than

0.15.  His phenytoin was 6.2, which is low and his valproic acid was less than 
12.5.

 He has taken all these today. 



ASSESSMENT:  

1. Seizure.

2. Bilateral subdural hematomas, likely chronic in nature.

3. Mild hyperkalemia, resolved.

4. History of hypertension, seizure, dysphagia, type 2 diabetes, alcoholic

cirrhosis, constipation, possibly slightly malnourished. 



PLAN:  

1. Neurosurgery has been consulted to appreciate recommendations.  No surgical

recommendations.  Recommend back to the nursing home. 

2. Likely needs better control of his medications at the nursing facility.  I 
have

discussed with the case management.  They are discussing with the nursing 
facility.

He will need monitoring. 

3. Speech Therapy has evaluated the patient to have a modified diet.

4. Discussed with family, but hope to discharge back to the skilled nursing 
facility

today and we will need close followup for compliance with his medications.  I do
not

believe that he is able to be sure of his own medications nor his own diet as he

appears fairly frail here.  We could offer hospice and PEG tube placement.  This

will be discussed and immediately available for discussion of the same. 

5. Hope to discharge back to skilled nursing facility.

6. Continue all supportive care at this time.



1530 hours:  Was able to call and speak to Joellen patient daughter in [place] 
and her family, reference condition.  Explained needs to have close monitoring 
at NH as likely had a seizure and not sure he is able to feed himself at this 
point, they understand.  (This is also in discharge paperwork and was discussed 
via phone to NH staff by RN/case management).  I also discussed PEG tube 
placement for nutrition and they state this is not something Mr. Esparza would 
be intersted in, would prefer to let nature take course.  I then discussed 
hospice care and what hospice was and that I believe that Mr. Esparza would 
qualify.  They appreciated the conversation, are okay with discharge back to the
SNF today (or tomorrow depending on bed).  I answered all questions about the 
current condition and plan as of now.   I advised them to have more discussions 
with the provider at the SNF/PCP reference goals of care and hospice.  They 
verbalized understanding. 

MAYELA Connelly PA-C



Job ID:  [id number]



[facility]

## 2020-11-14 RX ADMIN — DIVALPROEX SODIUM SCH: 500 TABLET, DELAYED RELEASE ORAL at 13:18

## 2020-11-14 RX ADMIN — DIVALPROEX SODIUM SCH MG: 500 TABLET, DELAYED RELEASE ORAL at 19:51

## 2020-11-14 RX ADMIN — DIVALPROEX SODIUM SCH MG: 500 TABLET, DELAYED RELEASE ORAL at 12:07

## 2020-11-14 NOTE — PRG
DATE OF SERVICE:  11/14/2020



SUBJECTIVE:  Mr. Esparza is actually discharged last night, however, is still here

holding waiting for a bed at the skilled nursing facility.  He remained stable.  No

further seizures.  He is requesting his meals today and the nurse is going to help

feed him.  I have discussed with the family yesterday.  They are not interested in

PEG tube placement.  They may be interested in hospice and they are considering the

same.  I asked them to talk to the primary care as this could be an ongoing

condition.  They verbalized understanding of the same. 



OBJECTIVE:  VITAL SIGNS:  Temperature is 97.9, blood pressure 137/83, heart rate is

60, respiratory rate 18, 95% on room air. 

GENERAL:  A 75-year-old male, sitting up in no acute distress. 

HEENT:  Normocephalic, atraumatic.  Trachea is midline. 

RESPIRATORY:  Equal rise and fall. 

CARDIOVASCULAR:  Strong pulses. 

ABDOMEN:  Soft. 

MUSCULOSKELETAL:  Slight cachexia is noted. 

PSYCH:  Normal mood and affect. 

NEURO:  Pleasantly confused.



LABORATORY DATA:  There is no laboratory data to report.



ASSESSMENT:  

1. Seizures, likely noncompliant on medications.

2. Bilateral subdural hematoma, is likely chronic versus acute on chronic.

3. Mild hyperkalemia that was resolved.

4. History of hypertension, seizure, dysphagia, type 2 diabetes, alcohol cirrhosis,

constipation.  Appears slightly malnourished. 



PLAN:  

1. Encourage feeding.

2. Continue all supportive care.

3. Continue PT/OT as needed.

4. Waiting for a bed.  I have discussed with Case Management early this morning

about 4 hours ago.  I am still waiting to hear back.  Hopefully, the patient will be

able to be sent home.  No further intervention. 







Job ID:  [id number]

## 2020-11-15 RX ADMIN — DIVALPROEX SODIUM SCH MG: 125 CAPSULE, COATED PELLETS ORAL at 22:20

## 2020-11-15 RX ADMIN — DIVALPROEX SODIUM SCH: 125 CAPSULE, COATED PELLETS ORAL at 20:48

## 2020-11-15 RX ADMIN — DIVALPROEX SODIUM SCH MG: 125 CAPSULE, COATED PELLETS ORAL at 14:46

## 2020-11-15 RX ADMIN — DIVALPROEX SODIUM SCH: 500 TABLET, DELAYED RELEASE ORAL at 15:28

## 2020-11-15 RX ADMIN — DIVALPROEX SODIUM SCH MG: 500 TABLET, DELAYED RELEASE ORAL at 03:34

## 2020-11-15 NOTE — PRG
DATE OF SERVICE:  11/15/2020



SUBJECTIVE:  Mr. Esparza remains inpatient, now hospital day #3, awaiting discharge

back to the skilled nursing facility, status post seizure and acute on chronic

subdural hematoma.  No acute events overnight.  The patient is tolerating diet with

assistance from nursing staff.  He remains hemodynamically stable. 



OBJECTIVE:  VITAL SIGNS:  Temperature is 98.0, blood pressure is 126/75, heart rate

is 76, breathing 18 times per minute, saturating 96% on room air. 

GENERAL:  This is a 75-year-old cachectic male, sitting in bed.  He will arouse. 

HEENT:  Normocephalic, atraumatic.  He does have previous surgery scars noted about

the head. 

RESPIRATORY:  Equal rise and fall. 

CARDIOVASCULAR:  Strong pulses. 

ABDOMEN:  Soft. 

MUSCULOSKELETAL:  He is thin, but he does move his extremities well. 

PSYCHIATRIC:  Somewhat withdrawn at times. 

NEUROLOGIC:  He is confused at times.  GCS is 14.



LABORATORY DATA:  None to review for today.



ASSESSMENT:  

1. Seizures, likely noncompliant on medications.

2. Bilateral subdural hematomas, likely chronic, but acute versus acute on chronic.

3. Hyperkalemia, resolved.

4. History of hypertension, seizure, dysphagia, type 2 diabetes, alcoholic

cirrhosis, constipation, and slightly malnourished. 



PLAN:  

1. We will continue to encourage feeding.  I have discussed with bedside RN.

2. Stop IV fluids.

3. Continue all other supportive care as well as PT and OT.  Continue to wait for a

bed.  We will discuss again with Case Management.  We hope to discharge back to the

patient's facility in the ensuing days.  Coordinated with the bedside RN.  No family

at the bedside. 







Job ID:  [id number]

## 2020-11-16 VITALS — TEMPERATURE: 98.5 F

## 2020-11-16 RX ADMIN — DIVALPROEX SODIUM SCH MG: 125 CAPSULE, COATED PELLETS ORAL at 03:16

## 2020-11-16 RX ADMIN — DIVALPROEX SODIUM SCH MG: 125 CAPSULE, COATED PELLETS ORAL at 17:56

## 2020-11-16 NOTE — PRG
DATE OF SERVICE:  11/16/2020



This is Tereso Mays PA-C dictating a report for Daron Oneill DO.



SUBJECTIVE:  The patient is hospital day #4 status post a seizure and fall.  He has

sustained what appeared to be acute on chronic subdural hematoma that was determined

to be nonoperable and he has been awaiting return to his nursing facility.  The

patient has remained stable.  His Ne Coma Scale is 14, which is his baseline

due to his advanced dementia.  He is tolerating a diet.  He does need assistance by

nursing staff to eat.  He appears in no pain and he is able to sit up in bed. 



OBJECTIVE:  VITAL SIGNS:  Temperature is 98.1, heart rate 69, respirations 16,

oxygen saturation 95% on room air, blood pressure 146/75. 

GENERAL:  The patient is resting comfortably in bed.  He was asleep when I entered,

but easily awaken to verbal stimuli.  He asked to have helped sitting up and appears

at baseline mentation. 

RESPIRATIONS:  Nonlabored. 

HEART:  Regular rate and rhythm. 

ABDOMEN:  Soft with active bowel sounds. 

EXTREMITIES:  The patient moves all 4 extremities freely.  Pulses are 2+.



LABORATORY DATA:  There are no labs or radiographs to review this morning.



ASSESSMENT:  

1. Status post seizure, likely noncompliant on medications.

2. Bilateral subdural hematoma, acute on chronic.

3. Hyperkalemia, resolved.

4. History of hypertension, seizures, dysphagia, type 2 diabetes, alcoholic

cirrhosis, constipation, and slightly malnourished. 



PLAN:  Plan will be to continue assisting with oral intake.  Continue his home

medications and await placement determination.  Note, the Case Management team 

has been working diligently to get the patient return to his facility.  The patient

was seen this morning with Dr. Oneill during rounds. 







Job ID:  [id number]

## 2020-11-17 VITALS — DIASTOLIC BLOOD PRESSURE: 97 MMHG | SYSTOLIC BLOOD PRESSURE: 142 MMHG

## 2020-11-17 RX ADMIN — DIVALPROEX SODIUM SCH: 125 CAPSULE, COATED PELLETS ORAL at 02:12

## 2020-11-17 RX ADMIN — DIVALPROEX SODIUM SCH: 125 CAPSULE, COATED PELLETS ORAL at 14:27

## 2020-11-19 NOTE — PQF
CLINICAL DOCUMENTATION CLARIFICATION FORM:    

Dear : Daron Oneill          Date / Time: 11/19/2020 0558

Please exercise your independent, professional judgment in responding to the 
clarification form. 

Clinical indicators are provided on the bottom of this form for your review



Please check appropriate box(es):

[ x ] Protein Calorie Malnutrition:    [  ] Mild      [ x ] Moderate   [  ] 
Severe   

[  ] Other Malnutrition (please specify) 
_________________________________________

[  ] Underweight without malnutrition

[  ] Cachexia 

[  ] Other diagnosis ___________

[  ] Unable to determine

In addition, please specify:

Present on Admission (POA):  [ x ] Yes             [  ] No             [  ] 
Unable to determine

Physician Signature:             Date/Time:



For continuity of documentation, please document condition throughout progress 
notes and discharge summary.  Thank You.



To be completed by CDI/Coding staff for physician review: 







 Present Clinical Indicators - Signs / Symptoms / Labs Results and Location in 

Medical Record

 

[X] /81, Pulse 94, Resp 18, Temp 97.9 Nutritional assessment Dietitian 
[place] 11/13

 

[X] BMI of 15.0 Nutritional assessment Dietitian [place] 11/13

 

[X] Underweight Nutritional assessment Dietitian [place] 11/13

 

[X] Weight loss Nutritional assessment Dietitian [place] 11/13

 

[X] Severe fat and msucle wasting visible to extremities, trunk, and face 
suggestive of severe malnutrition Nutritional assessment Dietitian [place] 11/13

 

[X] Slight malnourished PN p1 [place] 11/16

 

Present Risk Factors                                                            
              Results and Location in Medical Record

 

[X] 75 year-old Male H&P p1 11/12 Dr Mays

 

[X] Alzheimer Disease H&P p2 11/12 Dr Mays

 

[X] Dementia H&P p2 11/12 Dr Mays

 

[X] GERD H&P p2 11/12 Dr Mays

 

[X] DM HP 11/12

 

[X] Alcoholic cirrhosis liver HP 11/12

 

Present Treatments                                                              
                Results and Location in Medical Record

 

[X] Dietary consult  Nutritional assessment Dietitian [place] 11/13

 

[X] Nutritional supplements Nutritional assessment Dietitian [place] 11/13

 

[X] Weight monitoring Nutritional assessment Dietitian [place] 11/13

 

[X] Oral intake monitoring Nutritional assessment Dietitian [place] 11/13

 

[X] Appetite stimulant - medication Nutritional assessment Dietitian [place] 11/13









  CDS/ Signature: Erma Lopezfreddy      Phone #: [phone] ext [phone]    Date/Time: 11/19/2020 0558



Moderate Malnutrition (in acute illness)

?   Energy Intake: <75% of estimated energy requirement for > 7 days

?   Weight Loss:  1-2%/1 week; 5%/ 1 month; 7.5%/3 months

?   Other: mild body fat loss; mild muscle mass loss; mild fluid accumulation; 

Severe Malnutrition (in acute illness)

?   Energy Intake: ? 50% of estimated energy requirement for ? 5 days

?   Weight Loss: >2%/1 week; >5%/1 month; >7.5%/3 months

?   Other: moderate body fat loss; moderate muscle mass loss; moderate- severe 
fluid accumulation; measurably reduced  strength

Moderate Malnutrition (in chronic illness)

?   Energy Intake: <75% of estimated energy requirement for ?1 month

?   Weight Loss: 5%/1 month; 7.5%/3 months; 10%/6 months; 20%/1 year

?   Other: mild body fat loss; mild muscle mass loss; mild fluid accumulation

Severe Malnutrition (in chronic illness)

?   Energy Intake: ?75% of estimated energy requirement for ?1 month

?   Weight Loss: >5%/1 month; >7.5%/3 months; >10%/6 months; >20%/1 year

?   Other: severe body fat loss; severe muscle mass loss; severe fluid 
accumulation; measurably reduced  strength

                 This is a permanent part of the Medical Record

[facility]

## 2020-11-19 NOTE — PQF
CLINICAL DOCUMENTATION CLARIFICATION FORM:

Dear : Daron Oneill          Date / Time: 11/19/2020 0556

Please exercise your independent, professional judgment in responding to the 
clarification form. 

Clinical indicators are provided on the bottom of this form for your review



Please check appropriate box(es):

[  ] Acute on Chronic Non-traumatic Subdural Hemorrhage

[ x ] Chronic Non-traumatic Subdural Hemorrhage

[  ] Acute Traumatic Subdural Hemorrhage with Loss of consciousness, please 
specify duration: ___________

[  ] Acute Traumatic Subdural Hemorrhage without  Loss of consciousness, 

[  ] Other diagnosis, please specify ___________

[  ] Unable to determine

Physician Signature:                Date/Time:



For continuity of documentation, please document condition throughout progress 
notes and discharge summary.  Thank You.

To be completed by CDI/Coding staff for physician review: 







 Present Clinical Indicators - Signs / Symptoms / Labs Results and Location in 

Medical Record

 

[X] /81, Pulse 94, Resp 18, Temp 97.9 Vital signs 11/12

 

[X] reportedly had a seizure H&P p1 11/12 Dr Mays

 

[X] CT showed large subdural hematomas bilaterally, chronic in nature, with 
tiny suspected acute component H&P p1 11/12 Dr Mays

 

[X] Bilateral subdural hematoma chronic with suspected acute process H&P p3 
11/12 Dr Mays

 

[X] He reportedly had a seizure event with fall Consult Karen Tran

 

Present Risk Factors                                                            
              Results and Location in Medical Record

 

[X] 75 year-old Male H&P p1 11/12 Dr Mays

 

[X] Seizure disorder H&P p2 11/12 Dr Mays

 

[X] Alzheimer Disease H&P p2 11/12 Dr Mays

 

[X] HTN H&P p2 11/12 Dr Mays

 

[X] Dementia H&P p2 11/12 Dr Mays

 

[X] s/p Ruddy hole placement H&P p2 11/12 Dr Mays

 

Present Treatments                                                              
                Results and Location in Medical Record

 

[X] IVF NS 1L MAR 11/12

 

[X] Dilantin 187.5 mg oral MAR 11/12

 

[X] Keppra 1000 mg oral MAR 11/12

 

[X] Depakote 500 mg oral MAR 11/12

 

[X] Neuro consult Consult Karen Tran

 

[X] Admit to trauma Order Dr Mays 11/15





  CDS/ Signature: Erma Morales      Phone #: [phone] ext 

[phone]    Date/Time: 11/18/2020 0556

                 This is a permanent part of the Medical Record

[facility]

## 2020-11-22 ENCOUNTER — HOSPITAL ENCOUNTER (EMERGENCY)
Dept: HOSPITAL 18 - NAV ERS | Age: 75
LOS: 1 days | Discharge: ANOTHER HOSPITAL | End: 2020-11-23
Payer: MEDICARE

## 2020-11-22 DIAGNOSIS — E78.5: ICD-10-CM

## 2020-11-22 DIAGNOSIS — E87.2: ICD-10-CM

## 2020-11-22 DIAGNOSIS — I62.03: ICD-10-CM

## 2020-11-22 DIAGNOSIS — I48.91: ICD-10-CM

## 2020-11-22 DIAGNOSIS — Z79.82: ICD-10-CM

## 2020-11-22 DIAGNOSIS — Z79.899: ICD-10-CM

## 2020-11-22 DIAGNOSIS — F02.80: ICD-10-CM

## 2020-11-22 DIAGNOSIS — I10: ICD-10-CM

## 2020-11-22 DIAGNOSIS — E11.9: ICD-10-CM

## 2020-11-22 DIAGNOSIS — G40.909: ICD-10-CM

## 2020-11-22 DIAGNOSIS — R89.2: Primary | ICD-10-CM

## 2020-11-22 DIAGNOSIS — G30.9: ICD-10-CM

## 2020-11-22 LAB
ALBUMIN SERPL BCG-MCNC: 3.9 G/DL (ref 3.4–4.8)
ALP SERPL-CCNC: 103 U/L (ref 40–110)
ALT SERPL W P-5'-P-CCNC: 8 U/L (ref 8–55)
ANION GAP SERPL CALC-SCNC: 23 MMOL/L (ref 10–20)
AST SERPL-CCNC: 11 U/L (ref 5–34)
BASOPHILS # BLD AUTO: 0.1 THOU/UL (ref 0–0.2)
BASOPHILS NFR BLD AUTO: 0.7 % (ref 0–1)
BILIRUB SERPL-MCNC: 0.3 MG/DL (ref 0.2–1.2)
BUN SERPL-MCNC: 14 MG/DL (ref 8.4–25.7)
CALCIUM SERPL-MCNC: 9.5 MG/DL (ref 7.8–10.44)
CHLORIDE SERPL-SCNC: 102 MMOL/L (ref 98–107)
CO2 SERPL-SCNC: 18 MMOL/L (ref 23–31)
CREAT CL PREDICTED SERPL C-G-VRATE: 0 ML/MIN (ref 70–130)
EOSINOPHIL # BLD AUTO: 0.2 THOU/UL (ref 0–0.7)
EOSINOPHIL NFR BLD AUTO: 2.2 % (ref 0–10)
GLOBULIN SER CALC-MCNC: 4.8 G/DL (ref 2.4–3.5)
GLUCOSE SERPL-MCNC: 109 MG/DL (ref 83–110)
HGB BLD-MCNC: 14.6 G/DL (ref 14–18)
LYMPHOCYTES # BLD AUTO: 3.7 THOU/UL (ref 1.2–3.4)
LYMPHOCYTES NFR BLD AUTO: 40.7 % (ref 21–51)
MCH RBC QN AUTO: 26.8 PG (ref 27–31)
MCV RBC AUTO: 87.2 FL (ref 78–98)
MONOCYTES # BLD AUTO: 0.6 THOU/UL (ref 0.11–0.59)
MONOCYTES NFR BLD AUTO: 7 % (ref 0–10)
NEUTROPHILS # BLD AUTO: 4.5 THOU/UL (ref 1.4–6.5)
NEUTROPHILS NFR BLD AUTO: 49.4 % (ref 42–75)
PLATELET # BLD AUTO: 354 THOU/UL (ref 130–400)
POTASSIUM SERPL-SCNC: 4.5 MMOL/L (ref 3.5–5.1)
RBC # BLD AUTO: 5.46 MILL/UL (ref 4.7–6.1)
SODIUM SERPL-SCNC: 138 MMOL/L (ref 136–145)
WBC # BLD AUTO: 9.1 THOU/UL (ref 4.8–10.8)

## 2020-11-22 PROCEDURE — 83605 ASSAY OF LACTIC ACID: CPT

## 2020-11-22 PROCEDURE — 96374 THER/PROPH/DIAG INJ IV PUSH: CPT

## 2020-11-22 PROCEDURE — 80053 COMPREHEN METABOLIC PANEL: CPT

## 2020-11-22 PROCEDURE — 80177 DRUG SCRN QUAN LEVETIRACETAM: CPT

## 2020-11-22 PROCEDURE — 70450 CT HEAD/BRAIN W/O DYE: CPT

## 2020-11-22 PROCEDURE — 80185 ASSAY OF PHENYTOIN TOTAL: CPT

## 2020-11-22 PROCEDURE — 85025 COMPLETE CBC W/AUTO DIFF WBC: CPT

## 2020-11-22 PROCEDURE — 36415 COLL VENOUS BLD VENIPUNCTURE: CPT

## 2020-11-22 NOTE — CT
Head CT without contrast

11/22/2020:



COMPARISON: 11/12/2020



HISTORY: Altered mental status



TECHNIQUE: Axial CT imaging at 5 mm intervals from vertex through skull base without contrast



FINDINGS: The visualized paranasal sinuses and mastoid air cells are well-aerated. Bilateral anterior
 calvarial alvin holes are present.



There is a large hemispheric subdural hematoma on the right with mass effect on the right frontal lob
e which is flattened. There is also mass effect on the parietal lobe. There are areas of internal

hyperdensity within the subdural hematoma on the right which appear new suggesting an acute on chroni
c subdural hematoma. Right subdural hematoma measures up to 3.1 cm in transverse dimension and 13.8

cm in AP dimension, smaller than on the 11/12/2020 exam.



There is a complex subdural hematoma on the left, measuring up to 1.5 cm in short axis dimension ante
rior to the frontal lobe, increase in size when compared to the prior exam. Internal components of

hyperdensity suggest acute on chronic subdural hematoma.



There is prominent diffuse cerebral and cerebellar volume loss.



There is right to left midline shift which measures approximately 6 mm at the level of the septum pel
lucidum, similar when compared to the prior exam.



IMPRESSION: Bilateral acute on chronic subdural hematomas as detailed above, slightly increased in si
ze in the left frontal region with relatively stable right to left midline shift.



Results discussed with Dr. Calderon 9:35 PM 11/22/2020



Reported By: Sohail Dupont 

Electronically Signed:  11/22/2020 9:43 PM

## 2020-11-23 ENCOUNTER — HOSPITAL ENCOUNTER (INPATIENT)
Dept: HOSPITAL 92 - ERS | Age: 75
LOS: 3 days | Discharge: HOME | DRG: 64 | End: 2020-11-26
Attending: FAMILY MEDICINE | Admitting: FAMILY MEDICINE
Payer: MEDICARE

## 2020-11-23 VITALS — BODY MASS INDEX: 15.1 KG/M2

## 2020-11-23 DIAGNOSIS — R79.1: ICD-10-CM

## 2020-11-23 DIAGNOSIS — I48.91: ICD-10-CM

## 2020-11-23 DIAGNOSIS — Z90.5: ICD-10-CM

## 2020-11-23 DIAGNOSIS — E11.9: ICD-10-CM

## 2020-11-23 DIAGNOSIS — I62.03: ICD-10-CM

## 2020-11-23 DIAGNOSIS — K21.9: ICD-10-CM

## 2020-11-23 DIAGNOSIS — E43: ICD-10-CM

## 2020-11-23 DIAGNOSIS — T42.0X1A: ICD-10-CM

## 2020-11-23 DIAGNOSIS — E63.9: ICD-10-CM

## 2020-11-23 DIAGNOSIS — R13.10: ICD-10-CM

## 2020-11-23 DIAGNOSIS — E78.5: ICD-10-CM

## 2020-11-23 DIAGNOSIS — F02.80: ICD-10-CM

## 2020-11-23 DIAGNOSIS — R64: ICD-10-CM

## 2020-11-23 DIAGNOSIS — G30.9: ICD-10-CM

## 2020-11-23 DIAGNOSIS — G40.909: ICD-10-CM

## 2020-11-23 DIAGNOSIS — I62.01: Primary | ICD-10-CM

## 2020-11-23 DIAGNOSIS — Z20.828: ICD-10-CM

## 2020-11-23 DIAGNOSIS — F32.9: ICD-10-CM

## 2020-11-23 DIAGNOSIS — G92: ICD-10-CM

## 2020-11-23 DIAGNOSIS — I10: ICD-10-CM

## 2020-11-23 LAB
ALBUMIN SERPL BCG-MCNC: 3.8 G/DL (ref 3.4–4.8)
ALP SERPL-CCNC: 113 U/L (ref 40–110)
ALT SERPL W P-5'-P-CCNC: 7 U/L (ref 8–55)
ANION GAP SERPL CALC-SCNC: 17 MMOL/L (ref 10–20)
AST SERPL-CCNC: 13 U/L (ref 5–34)
BILIRUB SERPL-MCNC: 0.2 MG/DL (ref 0.2–1.2)
BUN SERPL-MCNC: 10 MG/DL (ref 8.4–25.7)
CALCIUM SERPL-MCNC: 9.7 MG/DL (ref 7.8–10.44)
CHLORIDE SERPL-SCNC: 102 MMOL/L (ref 98–107)
CO2 SERPL-SCNC: 23 MMOL/L (ref 23–31)
CREAT CL PREDICTED SERPL C-G-VRATE: 0 ML/MIN (ref 70–130)
GLOBULIN SER CALC-MCNC: 4.5 G/DL (ref 2.4–3.5)
GLUCOSE SERPL-MCNC: 91 MG/DL (ref 83–110)
HGB BLD-MCNC: 15.1 G/DL (ref 14–18)
MAGNESIUM SERPL-MCNC: 1.9 MG/DL (ref 1.6–2.6)
MCH RBC QN AUTO: 27.2 PG (ref 27–31)
MCV RBC AUTO: 87.3 FL (ref 78–98)
MDIFF COMPLETE?: YES
PLATELET # BLD AUTO: 288 THOU/UL (ref 130–400)
POTASSIUM SERPL-SCNC: 4.7 MMOL/L (ref 3.5–5.1)
RBC # BLD AUTO: 5.57 MILL/UL (ref 4.7–6.1)
SODIUM SERPL-SCNC: 137 MMOL/L (ref 136–145)
WBC # BLD AUTO: 9 THOU/UL (ref 4.8–10.8)

## 2020-11-23 PROCEDURE — U0003 INFECTIOUS AGENT DETECTION BY NUCLEIC ACID (DNA OR RNA); SEVERE ACUTE RESPIRATORY SYNDROME CORONAVIRUS 2 (SARS-COV-2) (CORONAVIRUS DISEASE [COVID-19]), AMPLIFIED PROBE TECHNIQUE, MAKING USE OF HIGH THROUGHPUT TECHNOLOGIES AS DESCRIBED BY CMS-2020-01-R: HCPCS

## 2020-11-23 PROCEDURE — 95819 EEG AWAKE AND ASLEEP: CPT

## 2020-11-23 PROCEDURE — 80053 COMPREHEN METABOLIC PANEL: CPT

## 2020-11-23 PROCEDURE — 84100 ASSAY OF PHOSPHORUS: CPT

## 2020-11-23 PROCEDURE — 83735 ASSAY OF MAGNESIUM: CPT

## 2020-11-23 PROCEDURE — 85025 COMPLETE CBC W/AUTO DIFF WBC: CPT

## 2020-11-23 PROCEDURE — 87449 NOS EACH ORGANISM AG IA: CPT

## 2020-11-23 PROCEDURE — 95816 EEG AWAKE AND DROWSY: CPT

## 2020-11-23 PROCEDURE — 80185 ASSAY OF PHENYTOIN TOTAL: CPT

## 2020-11-23 PROCEDURE — 80164 ASSAY DIPROPYLACETIC ACD TOT: CPT

## 2020-11-23 PROCEDURE — 87635 SARS-COV-2 COVID-19 AMP PRB: CPT

## 2020-11-23 PROCEDURE — 87324 CLOSTRIDIUM AG IA: CPT

## 2020-11-23 PROCEDURE — 36416 COLLJ CAPILLARY BLOOD SPEC: CPT

## 2020-11-23 PROCEDURE — 36415 COLL VENOUS BLD VENIPUNCTURE: CPT

## 2020-11-23 PROCEDURE — 83605 ASSAY OF LACTIC ACID: CPT

## 2020-11-23 PROCEDURE — 95957 EEG DIGITAL ANALYSIS: CPT

## 2020-11-23 RX ADMIN — DIVALPROEX SODIUM SCH: 500 TABLET, DELAYED RELEASE ORAL at 09:55

## 2020-11-23 RX ADMIN — DIVALPROEX SODIUM SCH: 500 TABLET, DELAYED RELEASE ORAL at 21:55

## 2020-11-23 RX ADMIN — MULTIPLE VITAMINS W/ MINERALS TAB SCH: TAB at 09:55

## 2020-11-23 RX ADMIN — LEVETIRACETAM SCH MLS: 10 INJECTION INTRAVENOUS at 21:55

## 2020-11-23 RX ADMIN — DIVALPROEX SODIUM SCH: 500 TABLET, DELAYED RELEASE ORAL at 18:26

## 2020-11-23 NOTE — PRG
DATE OF SERVICE:  11/23/2020



ADDENDUM:  

This is an addendum to the note of Dr. Santo Hood. 



Mr. Esparza is an unfortunate 75-year-old black male, resident of [facility].  He had a prior history of bilateral subdural hematomas.  He was noted in the

nursing home to be confused and combative and was brought to our emergency room.  He

also has a history of Alzheimer dementia.  His initial blood work showed a

significant elevation of his Dilantin level, which may be contributing to his

confused state.  In the event, this has been held for the time being.  The CT of the

brain showed bilateral acute on chronic subdural hematomas with a slight increase in

size of the left frontal region with stable right to left midline shift of 6 mm.

When I examined the patient, he was awake but confused, not as combative as

previously. 







Job ID:  [id number]

## 2020-11-23 NOTE — CON
DATE OF CONSULTATION:  11/23/2020



HISTORY OF PRESENT ILLNESS:  The patient is a 75-year-old  male with

past medical history of Alzheimer disease, who lives at the nursing home, whom I

have been asked to re-evaluate for his chronic subdural hematomas.  He was evaluated

by us on 

11/12/2020 for recurrent bilateral subdural hematomas, which are chronic.  He was

evacuated with bifrontal alvin holes on 09/29/2020 by Dr. Nelson's team.

Unfortunately, he had recurrence of these due to lack of brain expansion following

evacuation.  He returns to the ER last night for some altered mental status changes

and possible seizure event.  He was admitted to the medicine team for further

workup. 



PHYSICAL EXAMINATION:

GENERAL:  I visited the patient in the ER at the bedside.  He awakens easily to

voice and is telling me to please leave him alone. 

HEENT:  Head, normocephalic and atraumatic.  Eyes, PERRLA.  Extraocular movements

intact.  ENT, pink, intact, moist.  He has normal voice. 

NECK:  Moving around easily, not appeared to be tender.  He has free active range of

motion. 

CARDIAC:  Regular rate and rhythm. 

LUNGS:  Symmetric chest expansion.  No evidence of dyspnea. 

MUSCULOSKELETAL:  He is moving all 4s easily and pushes my hand away, saying he does

not want to be bothered at this time. 

NEUROLOGIC:  Confused, which appears to be his baseline due to his Alzheimer's

dementia.  His speech is normal.  He is moving all 4s and pushing away my hand. 



ASSESSMENT AND PLAN:  This is a 75-year-old male with Alzheimer's dementia from the

nursing home.  We have been following him for his bilateral chronic subdural

hematomas, which he failed to re-expand after bifrontal subdural evacuation with

alvin holes.  Unfortunately, he is not a good surgical candidate related to loss of

overall brain volume and atrophy preventing re-expansion following the drainage.

Given this as well 

as a baseline for neurologic function, we are not recommending any surgical effort.

I would recommend a palliative care consultation and feel that he can be discharged 

back to his nursing home at any time once his additional medical issues are resolved

with the medicine team.  No specific neurosurgical followup is recommended. 







Job ID:  [id number]

## 2020-11-23 NOTE — PDOC.FPRHP
- History of Present Illness


Chief Complaint: AMS


History of Present Illness: 








Pt is a 74 y/o nursing home resident at [facility]. History was 

obtained from NH and ED physician as patient is unable to provide a reliable 

history. According to NH pt had become altered and nurses had noted that 

patient's speech was more gargled and they were unable to understand him. As 

patient's presentation was much different than his baseline he was sent to 

[place] ED for further evaluation and to have his keppra  and dilantin levels 

checked. 





- Allergies/Adverse Reactions


                                    Allergies











Allergy/AdvReac Type Severity Reaction Status Date / Time


 


No Known Drug Allergies Allergy   Verified 11/12/20 20:55














- Home Medications


                                        











 Medication  Instructions  Recorded  Confirmed  Type


 


Atenolol [Tenormin] 50 mg PO DAILY 09/29/20 11/23/20 History


 


Digoxin 125 mcg PO DAILY 09/29/20 11/23/20 History


 


Divalproex Sodium DR [Depakote] 500 mg PO TID 09/29/20 11/23/20 History


 


Donepezil HCl [Aricept] 20 mg PO QPM 09/29/20 11/23/20 History


 


Ezetimibe 10 mg PO DAILY 09/29/20 11/23/20 History


 


Memantine HCl 10 mg PO BID 09/29/20 11/23/20 History


 


Mirtazapine 7.5 mg PO QPM 09/29/20 11/23/20 History


 


Multivitamin [Multi-Vitamin Daily] 1 tablet PO DAILY 09/29/20 11/23/20 History


 


Omeprazole Magnesium [Prilosec] 20 mg PO DAILY 09/29/20 11/23/20 History


 


Phenytoin 7.5 ml PO BID 09/29/20 11/23/20 History


 


Simvastatin [Zocor] 40 mg PO HS 09/29/20 11/23/20 History


 


levETIRAcetam 1,000 mg PO BID 09/29/20 11/23/20 History


 


Acetaminophen [Tylenol] 650 mg PO Q6H PRN #60 capsule 10/02/20 11/23/20 Rx


 


Guaifenesin -10 [Robitussin 5 ml PO Q4H PRN 11/12/20 11/12/20 History





DM]    


 


Mag Hydrox/Aluminum Hyd/Simeth 30 ml PO DAILY PRN 11/12/20 11/12/20 History





[Maalox Advanced Suspension]    


 


diphenhydrAMINE [Benadryl] 25 mg PO Q12H PRN 11/12/20 11/23/20 History


 


Aspirin 325 mg PO DAILY #0 11/13/20 11/12/20 Rx














- History


PMHx:


 -Alzhiemer's


-HTN


-HLD


-Afib


-T2DM


-GERD


-ETOH cirrhosis of liver





PSHx: 


-nephrectomy


-bilateral alvin hole placemetn 





FHx:


-non-contributory


 


Social:


-Stays at [facility]


-No TAD


-Apparently reportedly ambulates with wheelchair, and is able to do some 

transfers 


 








- Review of Systems


ROS unobtainable: due to mental status





- Vital signs


BP: []  HR: [] RR: [] Tmax: [] Pox: []% on []  Wt: []   








- Physical Exam


Constitutional: NAD, awake, alert and oriented, other (cachexic appearing)


HEENT: PERRLA, no scleral icterus


Neck: supple


Heart: RRR, normal S1/S2, no murmurs/rubs/gallops


Lungs: CTAB, no respiratory distress


Abdomen: soft


Neurological: other (contractures noted of bilateral UE and L leg)


Skin: no rash/lesions, other (cap refill > 2 sec)


Heme/Lymphatic: no unusual bruising or bleeding


Psychiatric: other (unable to obtain as patient's speech is non coherent)





FMR H&P: Results





- Radiology Interpretation


  ** CT scan - head


Status: report reviewed by me (bilateral acute on chronic subdural hematomas 

slightly increased in size in the left frontal region with relatively stable 

right to left midline shift)





FMR H&P: A/P





- Plan








##Acute Encephalopathy most likely 2/2 acute on chronic subdural hematomas


-versus phenytoin toxicity 


-CT brain showed  bilateral acute on chronic subdural hematomas with increase in

 size of left frontal region with stable right to left midline shift of 6mm. Pt 

is s/p bilateral alvin hole placement on 9/29 as patient had emergency surgery 

for this. Discussed with Neurosurgery and consult placed for them to reeval in 

AM. Not a surgical candidate. Grave prognosis. Phenytoin level also elevated at 

30.1, could be causing slurred speech. No nystagmus noted on exam. 


-palliative care consult placed 


-dilantin re-draw for this AM, held med at this moment 





##Seizure disorder


-Hx of seizures on keppra and dilantin


-keppra level sendout pending


-dilantin initially 30(H), will redraw this AM


-continue with keppra


-seizure precautions 





##Elevated Lactate


-LA initially 7-->2.5


-WBC wnl


-continue with mIVF





##Alzhimer's disease 


-aware


-restart medications 





##Cachexia, Nutritional Deficiency 


-aware


-see previous notes about speech eval from previous admission about dietary 

needs


-needs feeder, honey thick liquids





Chronic Conditions: 


##HTN


##HLD


##GERD


##Atrial Fibrillation


-aware


-restart home meds





DIET: NPO


PCP: [place]


CODE: FULL





Dispo: admitted to stroke for observation. 


 





FMR H&P: Upper Level





- Plan


Date/Time: 11/23/20 0310





Mr Esparza is a 76yo male with advanced dementia and recent fall with subsequent

 bilateral subdural hemorrhage with alvin hole placement and evacuation on 9/29 

by Dr Nelson. Today the nurse at [facility] reports she called 

EMS due to AMS. Did not feel that he was having a seizure but he was not as 

responsive and noticed gargled speech and eye fluttering. Nursing aides report 

this is very different from his baseline since the fall. He has had a decline 

since the fall, however he was able to speak more and speech was more 

understandable than it was today. We spoke with Karen Verdugo on regarding 

patient. Patient well known to her, he is not a surgical candidate and there was

 discussion of hospice/palliative care. Due to significant brain atrophy the 

thought is the blood will keep reaccumulating.  





PE: 


General: Opens eyes to voice, moans in response to questions. Not able to 

verbalize any words. 


HEENT: poor dentition, dry MM


CV: RRR. Distant heart sounds


Pulm: CTA b/l


Extremities: Contracted bilateral arms and left leg


Neuro: No nystagmus present





A/P: 


Acute encephalopathy likely 2/2 Acute on Chronic Subdural hematoma with stable 

midline shift  vs  Phenytoin toxicity


- CT read with bilateral acute on chronic subdural hematomas with increase in 

size of left frontal region with stable right to left midline shift of 6mm. 

Discussed with Neurosurgery and consult placed for them to reeval in AM. Not a 

surgical candidate. Grave prognosis. Phenytoin level also elevated at 30.1, 

could be causing slurred speech. No nystagmus noted on exam. Consult placed for 

palliative care. Appears they reached out to family to discuss goals of care and

 code status but remains Full code at this time per nursing home records. Placed

 diet per last dietician recommendations of pureed and nectar thick liquids. 

Requires assistance with feeding. Reviewed last PT/OT recommendations and placed

 consult for continued therapy. 


- In the past has required Haldol for agitation 





Seizure disorder/Supratherapeutic Dilantin level


- Seizure precautions. Hold Dilantin. Phenytoin level supratherapeutic at 30.1, 

will recheck midmorning. Continue Keppra





Elevated Lactate


- 7.6-> 2.5. Continue mIVF








I, Sandy Foote, have evaluated this patient and agree with findings/plan as 

outlined by intern resident. Pertinent changes/additions are listed here.

## 2020-11-23 NOTE — PDOC.FM
- Objective


MAR Reviewed: Yes





Dx/Plan





- Plan


Plan: 


Mr Esparza is a 74yo male with advanced dementia and recent fall with subsequent

bilateral subdural hemorrhage with alvin hole placement and evacuation on 9/29 by

Dr Nelson, who presents to the ED after the nurse at [facility] 

reports she called EMS due to AMS. He was transferred from [place].





1. Acute Encephalopathy most likely 2/2 Acute on Chronic Subdural Hematomas


-versus phenytoin toxicity 


-CT brain showed  bilateral acute on chronic subdural hematomas with increase in

size of left frontal region with stable right to left midline shift of 6mm. Pt 

is s/p bilateral alvin hole placement on 9/29 as patient had emergency surgery 

for this. Discussed with Neurosurgery and consult placed for them to reeval in 

AM. Not a surgical candidate. Grave prognosis. Phenytoin level also elevated at 

30.1, could be causing slurred speech. No nystagmus noted on exam. 


-palliative care consult placed 


-dilantin re-draw for this AM, held med at this moment 





2. Seizure Disorder


Hx of seizures on keppra and dilantin


-Keppra level: pending


-Dilantin level: initially 30(H), will redraw this AM


-Continue Keppra, Hold Dilantin for now.


-Seizure Precautions 





3. Elevated Lactate


-LA initially 7 > 2.5


-WBC wnl


-Continue with mIVF





4. Alzhimer's disease 


Continue Home Medications: Aricept & Mirtazipine


-Aware








5. Cachexia, Nutritional Deficiency 


-Aware


-See previous notes about speech eval from previous admission about dietary 

needs: requires feeder with pureed with honey thick liquids





6. HTN


Continue Home Medications: Atenolol





7. HLD


Continue Home Medications: Zocor





8. GERD


Continue Home Medications: Prilosec





9. Atrial Fibrillation


Continue Home Medications: Digoxin 





Code Status: Full


Diet: Renal- Pureed, Nectar Thick Liquids, Requires Feeder


IVF: LR @ 80


DVT PPx: Lovenox


GI PPx: Prilosec


PCP: [place]








Dispo: Stroke inpt, LOS > 48H.

## 2020-11-24 RX ADMIN — DIVALPROEX SODIUM SCH MG: 500 TABLET, DELAYED RELEASE ORAL at 09:32

## 2020-11-24 RX ADMIN — LEVETIRACETAM SCH MLS: 10 INJECTION INTRAVENOUS at 08:43

## 2020-11-24 RX ADMIN — DIVALPROEX SODIUM SCH MG: 500 TABLET, DELAYED RELEASE ORAL at 14:44

## 2020-11-24 RX ADMIN — DIVALPROEX SODIUM SCH: 500 TABLET, DELAYED RELEASE ORAL at 08:43

## 2020-11-24 RX ADMIN — DIVALPROEX SODIUM SCH MG: 500 TABLET, DELAYED RELEASE ORAL at 22:27

## 2020-11-24 RX ADMIN — MULTIPLE VITAMINS W/ MINERALS TAB SCH: TAB at 08:43

## 2020-11-24 RX ADMIN — DIVALPROEX SODIUM SCH: 500 TABLET, DELAYED RELEASE ORAL at 14:59

## 2020-11-24 RX ADMIN — MULTIPLE VITAMINS W/ MINERALS TAB SCH TAB: TAB at 09:33

## 2020-11-24 RX ADMIN — LEVETIRACETAM SCH MLS: 10 INJECTION INTRAVENOUS at 21:02

## 2020-11-24 NOTE — PRG
DATE OF SERVICE:  11/24/2020



Mr. Esparza looks much better this morning.  He is much more alert.  He is not

combative or confused.  He is enjoying a cup of coffee.  I believe that many of his

symptoms on admission were related to Dilantin toxicity as these have improved

markedly within 24 hours of stopping his Dilantin.  I feel it would be prudent to

continue him off Dilantin and attempt seizure control with the Keppra and Depakote. 







Job ID:  [id number]

## 2020-11-24 NOTE — PDOC.FM
- Subjective


Subjective: 


Pt is resting comfortable. He has no complaints.





- Objective


MAR Reviewed: Yes


Vital Signs & Weight: 


                             Vital Signs (12 hours)











  Pulse Resp BP Pulse Ox


 


 11/24/20 04:18  65  14  137/76  100


 


 11/23/20 21:41  69  16  150/92 H  100








                                     Weight











Weight                         41.277 kg














Result Diagrams: 


                                 11/23/20 14:47





                                 11/23/20 14:47





Phys Exam





- Physical Examination


Constitutional: NAD


HEENT: sclera anicteric


poor dentition


Neck: no nodes, supple


Respiratory: clear to auscultation bilateral


Cardiovascular: RRR, no significant murmur


Gastrointestinal: soft, non-tender, positive bowel sounds


Musculoskeletal: no edema, pulses present


Neurological: non-focal


Lymphatic: no nodes


Psychiatric: normal affect


Skin: no rash





Dx/Plan


(1) Encephalopathy


Code(s): G93.40 - ENCEPHALOPATHY, UNSPECIFIED   Status: Acute   





(2) Subdural hematoma


Code(s): S06.5X9A - TRAUM SUBDR HEM W LOC OF UNSP DURATION, INIT   Status: Acute

  





(3) Dilantin toxicity


Code(s): T42.0X1A - POISONING BY HYDANTOIN DERIVATIVES, ACCIDENTAL, INIT   

Status: Acute   





(4) AD (Alzheimer's disease)


Code(s): G30.9 - ALZHEIMER'S DISEASE, UNSPECIFIED; F02.80 - DEMENTIA IN OTH 

DISEASES CLASSD ELSWHR W/O BEHAVRL DISTURB   Status: Acute   





(5) Diabetes mellitus


Code(s): E11.9 - TYPE 2 DIABETES MELLITUS WITHOUT COMPLICATIONS   Status: Acute 

 





(6) A-fib


Code(s): I48.91 - UNSPECIFIED ATRIAL FIBRILLATION   Status: Acute   





(7) Malnutrition


Code(s): E46 - UNSPECIFIED PROTEIN-CALORIE MALNUTRITION   Status: Acute   





(8) Depression


Code(s): F32.9 - MAJOR DEPRESSIVE DISORDER, SINGLE EPISODE, UNSPECIFIED   

Status: Acute   





(9) HTN (hypertension)


Code(s): I10 - ESSENTIAL (PRIMARY) HYPERTENSION   Status: Acute   





(10) GERD (gastroesophageal reflux disease)


Code(s): K21.9 - GASTRO-ESOPHAGEAL REFLUX DISEASE WITHOUT ESOPHAGITIS   Status: 

Acute   





(11) Dysphagia


Code(s): R13.10 - DYSPHAGIA, UNSPECIFIED   Status: Acute   





(12) HLD (hyperlipidemia)


Code(s): E78.5 - HYPERLIPIDEMIA, UNSPECIFIED   Status: Acute   





(13) Seizure


Code(s): R56.9 - UNSPECIFIED CONVULSIONS   Status: Acute   





- Plan


Plan: 


Pt is a 74 y/o nursing home resident at [facility]. History was 

obtained from NH and ED physician as patient is unable to provide a reliable 

history.





1. Acute Encephalopathy most likely 2/2 acute on chronic subdural hematomas 

versus phenytoin toxicity 


CT brain showed  bilateral acute on chronic subdural hematomas with increase in 

size of left frontal region with stable right to left midline shift of 6mm. Pt 

is s/p bilateral alvin hole placement on 9/29 as patient had emergency surgery 

for this. Discussed with Neurosurgery and consult placed for them to reeval in 

AM. Not a surgical candidate. Grave prognosis. Phenytoin level also elevated at 

30.1, could be causing slurred speech. No nystagmus noted on exam. 


* Palliative care consult placed 


* Dilantin held due to elevated levels





2. Seizure disorder


-Hx of seizures on Keppra and Dilantin and Depakote


-Keppra level: pending


-Dilantin initially 30 > 35


-Continue with Keppra


-Seizure precautions 





3. Elevated Lactate


-LA initially 7 > 2.5 > 4


-WBC wnl


-continue with mIVF





4. Alzhimer's disease 


-Continue Home Medications: Aricept





5. Cachexia, Nutritional Deficiency 


-see previous notes about speech eval from previous admission about dietary 

needs: needs feeder, honey thick liquids


-NPO currently pending speech eval





6. HTN


-Continue Home Medications after speech eval: Atenolol





7. HLD


-Continue Home Medications after speech eval: Zocor





8. GERD


-Continue Home Medications after speech eval: Protonix





9. Atrial Fibrillation


-Continue Home Medications: Digoxin





Code Status: Full


Diet: NPO


DVT PPx: SCDs


GI PPx: Protonix


PCP: [place]








Dispo: Will attempt to contact family. Awaiting speech eval. Consider hospice.

## 2020-11-24 NOTE — PDOC.BPN
- Brief Progress Note





Code green called @ 1500. Nurse reports pt had been having seizure-like activity

for 10 minutes. According to nurse, R UE was shaking, jaw clenched, unresponsive

to sternal rub. BG normal, BP normal. Upon exam, patient had stopped seizing, 

was confused and agitated. When nasal cannula applied and pulse ox placed, O2 

sat 98% with good wave form. 0.5mg ativan given. Consulting neurology, Dr An.

## 2020-11-25 RX ADMIN — MULTIPLE VITAMINS W/ MINERALS TAB SCH TAB: TAB at 10:15

## 2020-11-25 RX ADMIN — LEVETIRACETAM SCH MG: 100 SOLUTION ORAL at 22:40

## 2020-11-25 RX ADMIN — DIVALPROEX SODIUM SCH MG: 500 TABLET, DELAYED RELEASE ORAL at 14:41

## 2020-11-25 RX ADMIN — DIVALPROEX SODIUM SCH MG: 500 TABLET, DELAYED RELEASE ORAL at 10:14

## 2020-11-25 RX ADMIN — DIVALPROEX SODIUM SCH MG: 500 TABLET, DELAYED RELEASE ORAL at 22:27

## 2020-11-25 NOTE — PDOC.FM
- Subjective


Subjective: 


He is awake and alert this morning. He is complaining about his IV in his right 

foot. He is hungry this morning.





- Objective


MAR Reviewed: Yes


Vital Signs & Weight: 


                             Vital Signs (12 hours)











  Temp Pulse Resp BP Pulse Ox


 


 11/25/20 04:00  97.8 F  70  16  99/54 L  100


 


 11/25/20 00:00  97.5 F L  63  18  102/55 L  99


 


 11/24/20 20:00  98.0 F  71  18  107/58 L  100








                                     Weight











Admit Weight                   41.277 kg


 


Weight                         41.277 kg














I&O: 


                                        











 11/23/20 11/24/20 11/25/20





 06:59 06:59 06:59


 


Intake Total   1200


 


Output Total  1 


 


Balance  -1 1200











Result Diagrams: 


                                 11/23/20 14:47





                                 11/23/20 14:47





Phys Exam





- Physical Examination


Constitutional: NAD


HEENT: moist MMs, sclera anicteric


poor dentition


Neck: supple


Respiratory: no wheezing, clear to auscultation bilateral


Cardiovascular: RRR, no significant murmur


Gastrointestinal: soft, non-tender


Musculoskeletal: no edema, pulses present


Neurological: non-focal, moves all 4 limbs


Lymphatic: no nodes


Psychiatric: normal affect


Skin: no rash





Dx/Plan


(1) Encephalopathy


Code(s): G93.40 - ENCEPHALOPATHY, UNSPECIFIED   Status: Acute   





(2) Subdural hematoma


Code(s): S06.5X9A - TRAUM SUBDR HEM W LOC OF UNSP DURATION, INIT   Status: Acute

  





(3) Dilantin toxicity


Code(s): T42.0X1A - POISONING BY HYDANTOIN DERIVATIVES, ACCIDENTAL, INIT   

Status: Acute   





(4) AD (Alzheimer's disease)


Code(s): G30.9 - ALZHEIMER'S DISEASE, UNSPECIFIED; F02.80 - DEMENTIA IN OTH 

DISEASES CLASSD ELSWHR W/O BEHAVRL DISTURB   Status: Acute   





(5) Diabetes mellitus


Code(s): E11.9 - TYPE 2 DIABETES MELLITUS WITHOUT COMPLICATIONS   Status: Acute 

 





(6) A-fib


Code(s): I48.91 - UNSPECIFIED ATRIAL FIBRILLATION   Status: Acute   





(7) Malnutrition


Code(s): E46 - UNSPECIFIED PROTEIN-CALORIE MALNUTRITION   Status: Acute   





(8) Depression


Code(s): F32.9 - MAJOR DEPRESSIVE DISORDER, SINGLE EPISODE, UNSPECIFIED   

Status: Acute   





(9) HTN (hypertension)


Code(s): I10 - ESSENTIAL (PRIMARY) HYPERTENSION   Status: Acute   





(10) GERD (gastroesophageal reflux disease)


Code(s): K21.9 - GASTRO-ESOPHAGEAL REFLUX DISEASE WITHOUT ESOPHAGITIS   Status: 

Acute   





(11) Dysphagia


Code(s): R13.10 - DYSPHAGIA, UNSPECIFIED   Status: Acute   





(12) HLD (hyperlipidemia)


Code(s): E78.5 - HYPERLIPIDEMIA, UNSPECIFIED   Status: Acute   





(13) Seizure


Code(s): R56.9 - UNSPECIFIED CONVULSIONS   Status: Acute   





- Plan


Plan: 


Pt is a 76 y/o nursing home resident at [facility]. History was 

obtained from NH and ED physician as patient is unable to provide a reliable 

history.





1. Acute Encephalopathy most likely 2/2 acute on chronic subdural hematomas 

versus phenytoin toxicity 


CT brain showed  bilateral acute on chronic subdural hematomas with increase in 

size of left frontal region with stable right to left midline shift of 6mm. Pt 

is s/p bilateral alvin hole placement on 9/29 as patient had emergency surgery 

for this. Discussed with Neurosurgery and consult placed for them to reeval in 

AM. Not a surgical candidate. Grave prognosis. Phenytoin level also elevated at 

30.1, could be causing slurred speech. No nystagmus noted on exam. 


* Palliative care consult placed 


* Dilantin held due to elevated levels





2. Seizure disorder


-Hx of seizures on Keppra and Dilantin and Depakote


-Keppra level: pending


-Dilantin initially 30 > 35


-Continue with Keppra, transititoned to oral after speech eval


-Seizure precautions 


-Valproic Acid: 34, low. Possibly due to NPO for 1 day but may need adjusting


-Partial Seizure yesterday, Neuro consulted, appreciate recs. Recommend 1000 mg 

of Keppra. Pt received 0.5 of Ativan. 


-Will get neuro recs today and EEG





3. Elevated Lactate


-LA initially 7 > 2.5 > 1.6


-WBC wnl





4. Alzheimer's disease 


-Continue Home Medications: Aricept





5. Cachexia, Nutritional Deficiency 


-see previous notes about speech eval from previous admission about dietary 

needs: needs feeder, honey thick liquids





6. HTN


-Continue Home Medications after speech eval: Atenolol





7. HLD


-Continue Home Medications after speech eval: Zocor





8. GERD


-Continue Home Medications after speech eval: Protonix





9. Atrial Fibrillation


-Continue Home Medications: Digoxin





Code Status: Full


Diet: Regular diet with feeder- Pureed with extra gravy nectar thick liquids and

mighty shakes TID


DVT PPx: SCDs


GI PPx: Protonix


PCP: [place]








Dispo: Palliative will discuss code status and hospice with family. Neuro to get

EEG today. We will await further recs. Stopped fluids. Cdiff ordered. Keppra 

transitioned to PO.

## 2020-11-25 NOTE — PQF
CLINICAL DOCUMENTATION CLARIFICATION FORM:









Dear Dr. SENAIT TRAN                                 Date: 11-25-20



Please exercise your independent, professional judgment in responding to the 
clarification form. 

Clinical indicators are provided on the bottom of this form for your review.



Please check appropriate box(es):

[ x ] Protein Calorie Malnutrition:    [  ] Mild      [  ] Moderate   [ x ] 
Severe   

[  ] Other Malnutrition (please specify) 
_________________________________________

[  ] Other diagnosis ___________

[  ] Unable to determine



In addition, please specify:

Present on Admission (POA):  [ x ] Yes             [  ] No             [  ] 
Unable to determine



For continuity of documentation, please document condition throughout progress 
notes and discharge summary.  Thank You.



To be completed by CDI/Coding staff for physician review: 

CLINICAL INDICATORS - SIGNS / SYMPTOMS / LABS    / RESULTS AND LOCATION IN MR:



H&P 11-23-20:   CACHEXIA, NUTRITIONAL DEFICIENCY, NEEDS FEEDER, HONEY THICK 
LIQUIDS



DIETICIAN CONSULT 11-25-20:   severe temporal muscle wasting observed with 
severe intercostal fat wasting observed ; severe intercostal fat wasting and 
severe temporal muscle wasting observed suggestive of severe malnutrition in the
context of a chronic disease



PN DR. SENAIT TRAN 11-25-20:    ENCEPHALOPATHY, SUBDURAL HEMATOMA, DILANTIN 
TOXICITY,  ALZHEIMER DISEASE, DM 2,  ACUTE MALNUTRITION



DIETICIAN CONSULT 11-25-20:  BMI: 15.1  



RISK FACTORS    / RESULTS AND LOCATION IN MR:



DIETICIAN CONSULT 11-24-20:   PMH:   Alzheimer's, HTN, hyperlipidemia, atrial 
fibrillation, DM, GERD, alcoholic cirrhosis of the liver, nephrectomy, bilateral
alvin holes



TREATMENT    / RESULTS AND LOCATION IN MR:



DIETICIAN CONSULT 11-24-20: 



1) Change to 1800 Consistent Carbohydrate, Diet. Leave off Heart Healthy 
restriction to promote po intake.  Recommend consistency per SLP recommendation.

2) Supplement with SF Mighty Shake TID



Moderate Malnutrition (in acute illness)

   Energy Intake: <75% of estimated energy requirement for > 7 days

   Weight Loss:  1-2%/1 week; 5%/ 1 month; 7.5%/3 months

   Other: mild body fat loss; mild muscle mass loss; mild fluid accumulation; 

Severe Malnutrition (in acute illness)

   Energy Intake: = 50% of estimated energy requirement for = 5 days

   Weight Loss: >2%/1 week; >5%/1 month; >7.5%/3 months

   Other: moderate body fat loss; moderate muscle mass loss; moderate- severe 
fluid accumulation; measurably reduced  strength

Moderate Malnutrition (in chronic illness)

   Energy Intake: <75% of estimated energy requirement for =1 month

   Weight Loss: 5%/1 month; 7.5%/3 months; 10%/6 months; 20%/1 year

   Other: mild body fat loss; mild muscle mass loss; mild fluid accumulation

Severe Malnutrition (in chronic illness)

   Energy Intake: =75% of estimated energy requirement for =1 month

   Weight Loss: >5%/1 month; >7.5%/3 months; >10%/6 months; >20%/1 year

   Other: severe body fat loss; severe muscle mass loss; severe fluid 
accumulation; measurably reduced  strength



CDS Signature:   Malena Randle           Phone #:  [phone]           Date:
11-25-20



                 This is a permanent part of the Medical Record

[facility]

## 2020-11-25 NOTE — PDOC.EEG
Neurology EEG Report





- Report


Report: 





This EEG was performed using 24 channel Supercell video digital EEG machine with 24 

disc electrodes.    This was an extended 1 hours  4 minutes of EEG recording. 

Digital analysis of the EEG was done for Alon and seizure detection which 

revealed no abnormalities.





Background:


The posterior background rhythm was not observed.





Photic stimulation:


No response seen with photic stimulation.





Hyperventilation:


Not performed.





Sleep:


No stage change was observed.





EEG diagnosis:


Generalized irregular theta activity seen throughout the recording.


Absence of posterior background rhythm.





Clinical interpretation:


This EEG is consistent with moderate generalized nonspecific cerebral dys

function.

## 2020-11-25 NOTE — PRG
DATE OF SERVICE:  11/25/2020



Mr. Esparza remains awake and alert.  He is complaining that he cannot find his

shoes, but otherwise he is in no distress.  In retrospect of the same, most of his

sensorial changes previously were due to Dilantin toxicity, which is of course

subsequently been discontinued.  We are awaiting placement and are in discussions

for his long-term care with his family. 







Job ID:  [id number]

## 2020-11-25 NOTE — CON
NEUROLOGY CONSULTATION



DATE OF CONSULTATION:  11/25/2020



REASON FOR CONSULTATION:  Breakthrough seizures.



HISTORY OF PRESENT ILLNESS:  Mr. Esparza is a 75-year-old nursing home resident 
at

[facility], presented to the emergency room with altered 
mental

status and there was a concern about breakthrough seizures and according to the

nursing home staff on document review, his speech was slurred and he was 
extremely

confused with dizziness, which was different from his baseline, so the nursing 
home

staff decided to bring him to the emergency room for further evaluation.  In the

emergency room, he was found to have supratherapeutic Dilantin level, which was 
held

and he was continued on Keppra and Depakote polytherapy with no documented 
seizures

since admission to the hospital. 



REVIEW OF SYSTEMS:  Unobtainable due to patient's altered mental status.



ALLERGIES:  NO KNOWN DRUG ALLERGIES.



HOME MEDICATIONS:  

1. Atenolol 50 mg p.o. daily.

2. Digoxin 125 mcg p.o. daily.

3. Depakote 500 mg p.o. t.i.d.

4. Donepezil 20 mg p.o. q.p.m.

5. Ezetimibe 10 mg daily.

6. Memantine 10 mg p.o. b.i.d.

7. Mirtazapine 7.5 mg p.o. q.p.m.

8. Multivitamin 1 tablet p.o. daily.

9. Prilosec 20 mg p.o. daily.

10. Phenytoin 7.5 mL p.o. b.i.d.

11. Zocor 40 mg p.o. at bedtime.

12. Keppra 1000 mg p.o. b.i.d.

13. Tylenol 650 mg p.o. q.6 hours p.r.n.

14. Robitussin 5 mg p.o. q.4 hours p.r.n.

15. Magnesium hydroxide/Maalox advance suspension 30 mL p.o. daily p.r.n.

16. Benadryl 25 mg p.o. q.12 hours p.r.n.

17. Aspirin 325 mg daily.



PAST MEDICAL HISTORY:  Alzheimer disease, hypertension, hyperlipidemia, atrial

fibrillation, type 2 diabetes mellitus, gastroesophageal reflux disease, ethyl

alcohol cirrhosis of the liver. 



PAST SURGICAL HISTORY:  Nephrectomy, bilateral bur hole placement.



FAMILY HISTORY:  Significant family history.



SOCIAL HISTORY:  The patient lives at [facility].  He ambulates with 
a

wheelchair and is able to do some transfer. 



REVIEW OF SYSTEMS:  Unobtainable.



Vital Signs & Weight: 

                             Vital Signs (12 hours)







  Temp Pulse Resp BP Pulse Ox

 

 11/25/20 04:00  97.8 F  70  16  99/54 L  100

 

 11/25/20 00:00  97.5 F L  63  18  102/55 L  99

 

 11/24/20 20:00  98.0 F  71  18  107/58 L  100





                                     Weight







Admit Weight                   41.277 kg

 

Weight                         41.277 kg









I&O: 

                                        







 11/23/20 11/24/20 11/25/20



 06:59 06:59 06:59

 

Intake Total   1200

 

Output Total  1 

 

Balance  -1 1200







 



PHYSICAL EXAMINATION:

VITAL SIGNS:  Blood pressure 125/69, heart rate 64, respiratory rate 20. 

GENERAL:  The patient is extremely confused and combative.  He does not follow

commands.  He does not maintain eye contact. 

HEENT:  Normocephalic, atraumatic. 

CVS:  Regular rate and rhythm. 

CHEST:  Clear. 

ABDOMEN:  Soft. 

NEUROLOGICAL:  Mental status, the patient open eyes to verbal stimuli.  Does not

follow command.  Does not maintain eye contact.  Cranial nerves, pupils 4 mm, 
round

and reactive to light.  Face symmetric.  Tongue midline.  Moves neck in both

direction.  Hearing seems to be intact.  Motor, muscle tone and bulk are normal.

Moving all 4 extremities.  Minimal movements of all 4 extremities seen.  
Sensory,

withdraws all 4 extremities to nailbed pressure.  Gait deferred due to patient's

safety reasons. 



DATA REVIEWED:  I reviewed the CT scan which showed old bilateral acute on 
chronic

subdural hematoma, slightly increased in size in the left frontal region with

relatively stable right-to-left midline shift. 











ASSESSMENT AND PLAN:  



 

(1) Encephalopathy

Code(s): G93.40 - ENCEPHALOPATHY, UNSPECIFIED   Status: Acute   



(2) Subdural hematoma

Code(s): S06.5X9A - TRAUM SUBDR HEM W LOC OF UNSP DURATION, INIT   Status: Acute
  



(3) Dilantin toxicity

Code(s): T42.0X1A - POISONING BY HYDANTOIN DERIVATIVES, ACCIDENTAL, INIT   
Status: Acute   



(4) AD (Alzheimer's disease)

Code(s): G30.9 - ALZHEIMER'S DISEASE, UNSPECIFIED; F02.80 - DEMENTIA IN OTH 
DISEASES CLASSD ELSWHR W/O BEHAVRL DISTURB   Status: Acute   



(5) Diabetes mellitus

Code(s): E11.9 - TYPE 2 DIABETES MELLITUS WITHOUT COMPLICATIONS   Status: Acute 
 



(6) A-fib

Code(s): I48.91 - UNSPECIFIED ATRIAL FIBRILLATION   Status: Acute   



(7) Malnutrition

Code(s): E46 - UNSPECIFIED PROTEIN-CALORIE MALNUTRITION   Status: Acute   



(8) Depression

Code(s): F32.9 - MAJOR DEPRESSIVE DISORDER, SINGLE EPISODE, UNSPECIFIED   
Status: Acute   



(9) HTN (hypertension)

Code(s): I10 - ESSENTIAL (PRIMARY) HYPERTENSION   Status: Acute   



(10) GERD (gastroesophageal reflux disease)

Code(s): K21.9 - GASTRO-ESOPHAGEAL REFLUX DISEASE WITHOUT ESOPHAGITIS   Status: 
Acute   



(11) Dysphagia

Code(s): R13.10 - DYSPHAGIA, UNSPECIFIED   Status: Acute   



(12) HLD (hyperlipidemia)

Code(s): E78.5 - HYPERLIPIDEMIA, UNSPECIFIED   Status: Acute   



(13) Seizure

Code(s): R56.9 - UNSPECIFIED CONVULSIONS   Status: Acute   



Mr. Timmy Esparza is consulted for altered mental status most

likely secondary to Dilantin toxicity.  Discontinue Dilantin and continue 
Depakote

and Keppra polytherapy.  Depakote does interact with Dilantin, so no need for

medicine adjustment.  Check valproic acid levels in a.m.  Neuro checks every 4

hours.  Observe seizure precautions.  Ativan 2 mg IV for seizure greater than 2

minutes.  Continue home medications.  Continue medical management per primary 
team.

We will continue PT/OT/speech.  EEG to rule out cortical irritability.  DVT

prophylaxis and GI prophylaxis.  We will continue to follow. 



Thank you for the consult.







Job ID:  [id number]



[facility]

## 2020-11-26 VITALS — SYSTOLIC BLOOD PRESSURE: 150 MMHG | DIASTOLIC BLOOD PRESSURE: 107 MMHG | TEMPERATURE: 97.5 F

## 2020-11-26 RX ADMIN — LEVETIRACETAM SCH MG: 100 SOLUTION ORAL at 10:05

## 2020-11-26 RX ADMIN — MULTIPLE VITAMINS W/ MINERALS TAB SCH TAB: TAB at 10:06

## 2020-11-26 RX ADMIN — DIVALPROEX SODIUM SCH MG: 500 TABLET, DELAYED RELEASE ORAL at 10:06

## 2020-11-26 NOTE — PDOC.FM
- Subjective


Subjective: 


He is awake and alert this morning. He would like to eat. He denies any other 

complaints.





- Objective


MAR Reviewed: Yes


Vital Signs & Weight: 


                             Vital Signs (12 hours)











  Temp Pulse Resp BP Pulse Ox


 


 11/26/20 03:33  97.5 F L  79  16  108/56 L  96


 


 11/25/20 23:20  98.1 F  64  16  119/61  99


 


 11/25/20 20:00  98.8 F  60  16  143/89 H  100


 


 11/25/20 19:44  97.8 F  60  16  143/89 H  100








                                     Weight











Admit Weight                   41.277 kg


 


Weight                         41.277 kg














I&O: 


                                        











 11/24/20 11/25/20 11/26/20





 06:59 06:59 06:59


 


Intake Total  1200 140


 


Output Total 1  


 


Balance -1 1200 140











Result Diagrams: 


                                 11/23/20 14:47





                                 11/23/20 14:47





Phys Exam





- Physical Examination


Constitutional: NAD


HEENT: moist MMs, sclera anicteric


Neck: no nodes, supple


Respiratory: no wheezing, no rales, no rhonchi, clear to auscultation bilateral


Cardiovascular: RRR, no significant murmur


Gastrointestinal: soft, non-tender


Musculoskeletal: no edema, pulses present


Neurological: moves all 4 limbs


Lymphatic: no nodes


Psychiatric: normal affect


Skin: no rash





Dx/Plan


(1) Encephalopathy


Code(s): G93.40 - ENCEPHALOPATHY, UNSPECIFIED   Status: Acute   





(2) Subdural hematoma


Code(s): S06.5X9A - TRAUM SUBDR HEM W LOC OF UNSP DURATION, INIT   Status: Acute

  





(3) Dilantin toxicity


Code(s): T42.0X1A - POISONING BY HYDANTOIN DERIVATIVES, ACCIDENTAL, INIT   

Status: Acute   





(4) AD (Alzheimer's disease)


Code(s): G30.9 - ALZHEIMER'S DISEASE, UNSPECIFIED; F02.80 - DEMENTIA IN OTH 

DISEASES CLASSD ELSWHR W/O BEHAVRL DISTURB   Status: Acute   





(5) Diabetes mellitus


Code(s): E11.9 - TYPE 2 DIABETES MELLITUS WITHOUT COMPLICATIONS   Status: Acute 

 





(6) A-fib


Code(s): I48.91 - UNSPECIFIED ATRIAL FIBRILLATION   Status: Acute   





(7) Malnutrition


Code(s): E46 - UNSPECIFIED PROTEIN-CALORIE MALNUTRITION   Status: Acute   





(8) Depression


Code(s): F32.9 - MAJOR DEPRESSIVE DISORDER, SINGLE EPISODE, UNSPECIFIED   

Status: Acute   





(9) HTN (hypertension)


Code(s): I10 - ESSENTIAL (PRIMARY) HYPERTENSION   Status: Acute   





(10) GERD (gastroesophageal reflux disease)


Code(s): K21.9 - GASTRO-ESOPHAGEAL REFLUX DISEASE WITHOUT ESOPHAGITIS   Status: 

Acute   





(11) Dysphagia


Code(s): R13.10 - DYSPHAGIA, UNSPECIFIED   Status: Acute   





(12) HLD (hyperlipidemia)


Code(s): E78.5 - HYPERLIPIDEMIA, UNSPECIFIED   Status: Acute   





(13) Seizure


Code(s): R56.9 - UNSPECIFIED CONVULSIONS   Status: Acute   





- Plan


Plan: 


Pt is a 74 y/o nursing home resident at [facility]. History was 

obtained from NH and ED physician as patient is unable to provide a reliable 

history.





1. Acute Encephalopathy most likely 2/2 acute on chronic subdural hematomas 

versus phenytoin toxicity 


CT brain showed  bilateral acute on chronic subdural hematomas with increase in 

size of left frontal region with stable right to left midline shift of 6mm. Pt 

is s/p bilateral alvin hole placement on 9/29 as patient had emergency surgery 

for this. Discussed with Neurosurgery and consult placed for them to reeval in 

AM. Not a surgical candidate. Grave prognosis. Phenytoin level also elevated at 

30.1, could be causing slurred speech. No nystagmus noted on exam. 


* Palliative care consult placed 


* Dilantin held due to elevated levels





2. Seizure disorder


-Hx of seizures on Keppra and Dilantin and Depakote


-Keppra level: pending


-Dilantin initially 30 > 35


-Continue with Keppra, transititoned to oral after speech eval


-Seizure precautions 


-Valproic Acid: 34 > 49.5


-Partial Seizure yesterday, Neuro consulted, appreciate recs. Recommend 1000 mg 

of Keppra. Pt received 0.5 of Ativan. 


-Neuro consulted, Sharona, appreciate recs.


* Continue Keppra & Depakote


* EEG: moderate generalized nonspecific cerebral dysfunction





3. Elevated Lactate


-LA initially 7 > 2.5 > 1.6


-WBC wnl





4. Alzheimer's disease 


-Continue Home Medications: Aricept





5. Cachexia, Nutritional Deficiency 


-see previous notes about speech eval from previous admission about dietary 

needs: needs feeder, honey thick liquids





6. HTN


-Continue Home Medications after speech eval: Atenolol





7. HLD


-Continue Home Medications after speech eval: Zocor





8. GERD


-Continue Home Medications after speech eval: Protonix





9. Atrial Fibrillation


-Continue Home Medications: Digoxin





Code Status: Full


Diet: Regular diet with feeder- Pureed with extra gravy nectar thick liquids and

mighty shakes TID


DVT PPx: SCDs


GI PPx: Protonix


PCP: [place]





Dispo:  Will d/c today pending neurology recs back to nursing home.








Addendum - Attending





- Attending Attestation


Date/Time: 11/26/20 0234





I personally evaluated the patient and discussed the management with Dr. Hood.


I agree with the History, Examination, Assessment and Plan documented above with

any addition or exceptions noted below.


The patient is back to his baseline.  He is stable for d/c to [facility].

## 2020-11-26 NOTE — PDOC.NEUPN
- Subjective


Encounter Date: 11/26/20


Subjective: 


Mr. Esparza seems to be back to his baseline this morning and no reported acute 

events in the last 24 hours





- Objective


Vital Signs & Weight: 


                             Vital Signs (12 hours)











  Temp Pulse Resp BP Pulse Ox


 


 11/26/20 12:14  97.5 F L  65  14  150/107 H  98


 


 11/26/20 10:06   81   


 


 11/26/20 10:05   81   


 


 11/26/20 07:54  97.4 F L  81  18  98/58 L  97


 


 11/26/20 03:33  97.5 F L  79  16  108/56 L  96








                                     Weight











Admit Weight                   91 lb


 


Weight                         91 lb














I&O: 


                                        











 11/25/20 11/26/20 11/27/20





 06:59 06:59 06:59


 


Intake Total 1200 140 300


 


Balance 1200 140 300











Result Diagrams: 


                                 11/23/20 14:47





                                 11/23/20 14:47


Additional Labs: 


                                   Accuchecks











  11/26/20 11/26/20 11/25/20





  10:26 05:34 21:03


 


POC Glucose  89  80  77














  11/25/20 11/25/20





  18:07 16:23


 


POC Glucose  96  74











Radiology Reviewed by me: Yes


EKG Reviewed by me: Yes





ROS





- Review of Systems


ROS unobtainable: due to mental status





- Exam


General Appearance: awake alert


Eye: PERRL


ENT: normocephalic atraumatic


Neck: supple


Respiratory: CTAB


Cardiovascular: RRR


Gastrointestinal: soft


Extremities: no cyanosis


Skin: normal turgor


Neurological: no new deficit


Musculoskeletal: normal tone


PSYCH: normal affect, normal behavior





Results





- Labs


Result Diagrams: 


                                 11/23/20 14:47





                                 11/23/20 14:47


Lab results: 


                                        











WBC  9.0 thou/uL (4.8-10.8)   11/23/20  14:47    


 


Hgb  15.1 g/dL (14.0-18.0)   11/23/20  14:47    


 


Hct  48.6 % (42.0-52.0)   11/23/20  14:47    


 


MCV  87.3 fL (78.0-98.0)   11/23/20  14:47    


 


Plt Count  288 thou/uL (130-400)   11/23/20  14:47    


 


Band Neuts % (Manual)  5 % (5-11)   11/23/20  14:47    


 


Sodium  137 mmol/L (136-145)   11/23/20  14:47    


 


Potassium  4.7 mmol/L (3.5-5.1)   11/23/20  14:47    


 


Chloride  102 mmol/L ()   11/23/20  14:47    


 


Carbon Dioxide  23 mmol/L (23-31)   11/23/20  14:47    


 


BUN  10 mg/dL (8.4-25.7)   11/23/20  14:47    


 


Creatinine  0.81 mg/dL (0.7-1.3)   11/23/20  14:47    


 


Glucose  91 mg/dL ()   11/23/20  14:47    


 


Lactic Acid  1.6 mmol/L (0.5-2.2)   11/24/20  09:44    


 


Calcium  9.7 mg/dL (7.8-10.44)   11/23/20  14:47    


 


Total Bilirubin  0.2 mg/dL (0.2-1.2)   11/23/20  14:47    


 


AST  13 U/L (5-34)   11/23/20  14:47    


 


ALT  7 U/L (8-55)  L  11/23/20  14:47    


 


Alkaline Phosphatase  113 U/L ()  H  11/23/20  14:47    


 


Serum Total Protein  8.3 g/dL (5.8-8.1)  H  11/23/20  14:47    


 


Albumin  3.8 g/dL (3.4-4.8)   11/23/20  14:47    














PN A/P


(1) Dilantin toxicity


Code(s): T42.0X1A - POISONING BY HYDANTOIN DERIVATIVES, ACCIDENTAL, INIT   

Status: Acute   





(2) A-fib


Code(s): I48.91 - UNSPECIFIED ATRIAL FIBRILLATION   Status: Acute   





(3) AD (Alzheimer's disease)


Code(s): G30.9 - ALZHEIMER'S DISEASE, UNSPECIFIED; F02.80 - DEMENTIA IN OTH 

DISEASES CLASSD ELSWHR W/O BEHAVRL DISTURB   Status: Acute   





(4) Depression


Code(s): F32.9 - MAJOR DEPRESSIVE DISORDER, SINGLE EPISODE, UNSPECIFIED   

Status: Acute   





(5) Diabetes mellitus


Code(s): E11.9 - TYPE 2 DIABETES MELLITUS WITHOUT COMPLICATIONS   Status: Acute 

 





(6) Dilantin toxicity


Code(s): T42.0X1A - POISONING BY HYDANTOIN DERIVATIVES, ACCIDENTAL, INIT   

Status: Acute   





(7) Dysphagia


Code(s): R13.10 - DYSPHAGIA, UNSPECIFIED   Status: Acute   





(8) Encephalopathy


Code(s): G93.40 - ENCEPHALOPATHY, UNSPECIFIED   Status: Acute   





(9) GERD (gastroesophageal reflux disease)


Code(s): K21.9 - GASTRO-ESOPHAGEAL REFLUX DISEASE WITHOUT ESOPHAGITIS   Status: 

Acute   





(10) HLD (hyperlipidemia)


Code(s): E78.5 - HYPERLIPIDEMIA, UNSPECIFIED   Status: Acute   





(11) HTN (hypertension)


Code(s): I10 - ESSENTIAL (PRIMARY) HYPERTENSION   Status: Acute   





(12) Malnutrition


Code(s): E46 - UNSPECIFIED PROTEIN-CALORIE MALNUTRITION   Status: Acute   





(13) Seizure


Code(s): R56.9 - UNSPECIFIED CONVULSIONS   Status: Acute   





(14) Subdural hematoma


Code(s): S06.5X9A - TRAUM SUBDR HEM W LOC OF UNSP DURATION, INIT   Status: Acute

  





- Plan


Daily Plan: PT/OT, speech therapy, DVT proph w/SCDs





Mr. Esparza is a 75-year-old male with history significant for Alzheimer's 

dementia, seizure disorder, presented with altered mental status.  His Dilantin 

level found to be supratherapeutic so Dilantin was held since the day of 

admission which improved his mental status.


EEG reviewed which did not reveal any seizure activity.


Discontinue Dilantin.


Continue Keppra 1000 mg p.o. twice daily.


Continue Depakote 500 mg p.o. 3 times daily.


Observe seizure precautions.


Ativan 2 mg for seizure greater than 2 minutes.


Neurochecks every 4 hours.


Continue home medications.


Continue medical management per primary team.


Patient has been stable so can be discharged to the nursing home on Keppra and 

Depakote polytherapy and should not be restarted on Dilantin because of risk of 

toxicity.


Plan communicated with the primary team.

## 2020-11-29 NOTE — DIS
DATE OF ADMISSION:  11/24/2020



DATE OF DISCHARGE:  11/26/2020



ATTENDING:  Ashely Morales MD



RESIDENT:  Santo Hood MD



CONSULT:  Neurology, Dr. Tabor, on 11/24, recommended discontinuation of Dilantin.

 Continue Depakote and Keppra and use Ativan for seizures greater than 2 minutes. 



PROCEDURES:  EEG on 11/25/2020, showed moderate generalized nonspecific cerebral

dysfunction. 



PRIMARY DIAGNOSES:  

1. Acute encephalopathy, most likely secondary to acute on chronic subdural

hematomas versus phenytoin toxicity. 

2. Seizure disorder.

3. Elevated lactate.



SECONDARY DIAGNOSES:  

1. Alzheimer disease.

2. Cachexia with nutritional deficiency.

3. Hypertension.

4. Hyperlipidemia.

5. Gastroesophageal reflux disease.

6. Atrial fibrillation.



MEDICATIONS:  Continue home medications of;

1. Robitussin DM 5 mL p.o. q.4 hours p.r.n. for cough.

2. Maalox Advanced Suspension 30 mL p.o. daily p.r.n. for heartburn.

3. Aspirin 325 mg daily.

4. Zocor 40 mg at bedtime.

5. Prilosec 20 mg daily.

6. Donepezil 20 mg p.o. q.p.m.

7. Depakote 500 mg p.o. t.i.d.

8. Digoxin 125 mcg p.o. daily.

9. Atenolol 50 mg daily.

10. Tylenol 650 mg p.o. q.6 hours p.r.n. for pain.

11. Multivitamin one tablet daily.

12. Mirtazapine 7.5 mg p.o. q.p.m.

13. Memantine 10 mg p.o. b.i.d.

14. Tizanidine 10 mg p.o. daily.

15. Keppra 1000 mg p.o. b.i.d.

16. Benadryl 25 mg p.o. q.12 hours p.r.n. for agitation.



DISCONTINUED MEDICATIONS:  Phenytoin.



HISTORY OF PRESENT ILLNESS:  The patient is a 75-year-old nursing home resident in

[facility].  History was obtained from the ED physician and

nursing home as the patient is unable to provide reliable history.  According to the

nursing home, the patient had become altered and nurses had noted that the patient's

speech was more garbled and they were unable to understand him.  As the patient's

presentation was much different than his baseline, he was sent to [place] [facility] for

further evaluation to have his Keppra and Dilantin levels checked. 

1. Acute encephalopathy secondary to acute on chronic subdural hematoma and

phenytoin toxicity. 

2. CT brain showed bilateral acute on chronic subdural hematomas with increase in

size of left frontal region with stable right-to-left midline shift of 6 mm.  The

patient is status post bilateral alvin hole placement on 09/29, as the patient had

emergency surgery.  Discussed with Neurosurgery and consult placed for them.  He is

not a surgical candidate. 

3. Phenytoin level was elevated to 30 and 35.  We discontinued due to adverse side

effects of coadministration of phenytoin and Keppra. 

4. Seizure disorder.  History of seizures on Keppra and Dilantin and Depakote.

5. Keppra level still pending by the time of discharge.  Valproic acid level is

3449.  It should be therapeutic with continuation of dosing.  Dilantin was 30 and

35.  We will discontinue the medication.  Partial seizure while here of the right

upper extremity.  Apparently, the seizure lasts for 10 minutes and the patient

desaturated.  He was administered 0.5 of Ativan and was given another loading dose

of Keppra. 

6. Neurology consulted.  EEG and recommendations as above.  

7. Elevated lactate, which trended from 7 to 1.6, has resolved.

8. Alzheimer's.  Continue Aricept and Namenda.

9. Cachexia.  Nutritional deficiency.  Dietitian has seen the patient while in the

hospital.  Recommended pureed  __________ nectar thick liquids a.m., MightyShakes

t.i.d. 

10. Hypertension.  Continue atenolol.

11. Hyperlipidemia.  Continue Zocor.

12. GERD.  Continue Protonix.

13. Atrial fibrillation.  Continue digoxin.



DISCHARGE INSTRUCTIONS:  

1. Discharge to [facility].

2. Activity:  As tolerated.

3. Diet:  Regular with the above recommendations listed under cachexia.

4. Therapy:  Physical therapy.

5. Follow up with Dr. Nuñez in 7 days of discharge.







Job ID:  [id number]

## 2021-01-13 ENCOUNTER — HOSPITAL ENCOUNTER (EMERGENCY)
Dept: HOSPITAL 18 - NAV ERS | Age: 76
Discharge: HOME | End: 2021-01-13
Payer: MEDICARE

## 2021-01-13 DIAGNOSIS — S02.2XXA: Primary | ICD-10-CM

## 2021-01-13 DIAGNOSIS — K21.9: ICD-10-CM

## 2021-01-13 DIAGNOSIS — E11.9: ICD-10-CM

## 2021-01-13 DIAGNOSIS — Z79.899: ICD-10-CM

## 2021-01-13 DIAGNOSIS — E78.5: ICD-10-CM

## 2021-01-13 DIAGNOSIS — S02.85XA: ICD-10-CM

## 2021-01-13 DIAGNOSIS — I62.03: ICD-10-CM
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
## 2021-01-13 DIAGNOSIS — S01.412A: ICD-10-CM

## 2021-01-13 PROCEDURE — 70486 CT MAXILLOFACIAL W/O DYE: CPT

## 2021-01-13 PROCEDURE — 72125 CT NECK SPINE W/O DYE: CPT

## 2021-01-13 PROCEDURE — 12011 RPR F/E/E/N/L/M 2.5 CM/<: CPT

## 2021-01-13 PROCEDURE — 70450 CT HEAD/BRAIN W/O DYE: CPT

## 2021-01-13 PROCEDURE — 90471 IMMUNIZATION ADMIN: CPT

## 2021-01-13 PROCEDURE — 90715 TDAP VACCINE 7 YRS/> IM: CPT

## 2021-01-13 NOTE — CT
CT cervical spine noncontrast



HISTORY: Fall. Injury.



COMPARISON: 11/12/2020.



FINDINGS: Gentle reversal of the normal lordotic curvature is similar in appearance to the prior exam
. Vertebral body heights are maintained. Cervicothoracic junction is intact. Minimal degenerative

spondylolisthesis at the C4-5 level, stable. Osteophytosis throughout the vertebral bodies and facets
.



No acute fracture or dislocation.



  



IMPRESSION :





Degenerative changes and other chronic-type findings are stable. No acute osseous abnormalities are d
emonstrated.



Reported By: DEBRA Araujo 

Electronically Signed:  1/13/2021 9:10 AM

## 2021-01-13 NOTE — CT
EXAM:

CT facial bones



PROVIDED CLINICAL HISTORY:

75-year-old male with laceration to the left cheek after fall from bed



COMPARISON:

None



FINDINGS:



Bones:

Nasal bones: There are bilateral impacted nasal bone fractures.

Maxilla: Intact.

Mandible: Intact.

Zygomatic arches: Intact.

Pterygoid plates: Intact.

Orbital rims: There is a left superior medial orbital rim fracture with extension into the left front
al sinus. There is an air-fluid level within the left frontal sinus.

Orbital wall and floor: Intact.

Frontal skull: As above



Paranasal sinuses: Intact.



Orbits: Intact.



Visualized intracranial contents: There is diffuse cerebral atrophy with subdural hematomas, better d
etailed on the CT the brain dated 1/13/2021



Cervical spine: Intact.



Soft tissues: There is soft tissue swelling surrounding left femoral orbital region and left zygoma



IMPRESSION:



1. Bilateral impacted nasal bone fractures.

2. Minimally displaced left superior medial orbital rim fracture with extension into the left inferio
r lateral aspect of the frontal sinus with an air hemorrhage layer.

3. Bilateral subdural hematomas, please see CT the brain for further details.



Reported By: Cheikh Rivera 

Electronically Signed:  1/13/2021 9:18 AM

## 2021-01-13 NOTE — CT
CT Brain WO Con:



1/13/2021 8:25 AM



CLINICAL HISTORY: 75-year-old male with history of head injury and dementia; status post recent fall.




IMAGING TECHNIQUE: Multiple CT images were obtained of the brain without IV contrast.



COMPARISON: CT the brain without contrast dated November 22, 2020



FINDINGS:



BRAIN:  

Evidence of acute infarct:  None.

Evidence of chronic ischemic change:None.

Evidence of intracranial hemorrhage:  There are again seen bilateral subdural hematomas overlying the
 cerebral convexities bilaterally, right greater than left. The extent of the size of the right

subdural hematoma has diminished, previously measuring 2.9 cm on the prior examination now measuring 
2.1 cm. The collection overlying the left cerebral convexity is also slightly diminished measuring

2.8 mm, where the collection previously measured 7.4 mm.

Evidence of brain volume loss:There is stable severe diffuse cerebral and cerebellar atrophy.

Evidence of midline shift:  There is improvement in the right to left midline shift, now measuring 4 
mm, where previously it measured 8 mm.

Ventricles:  Normal.  No hydrocephalus.



SKULL:  Motion artifact limits evaluation of the lower skull and visualized portions of the face. Ple
ase see the CT the pacer further details.



VISUALIZED PARANASAL SINUSES:  Not well seen due to motion artifact



MASTOID AIR CELLS:  Not well seen due to motion artifact



EXTRACRANIAL SOFT TISSUES:  Normal.



IMPRESSION:



Improving bilateral subdural hematomas. Scattered internal densities within the hematoma collections 
are more suspicious for clotted blood rather than acute hemorrhage.  There is improvement of the

underlying mass effect on the cerebral hemispheres with improvement of the mid line shift seen on the
 prior exam. Stable diffuse cerebral atrophy. Limitations to the exam as above. Findings were

discussed with Dr. Granados at 10:00 AM on January 13, 2021.



Reported By: Cheikh Rivera 

Electronically Signed:  1/13/2021 10:19 AM

## 2021-03-04 ENCOUNTER — HOSPITAL ENCOUNTER (EMERGENCY)
Dept: HOSPITAL 92 - CSHERS | Age: 76
Discharge: ANOTHER HOSPITAL | End: 2021-03-04
Payer: MEDICARE

## 2021-03-04 DIAGNOSIS — I48.91: ICD-10-CM

## 2021-03-04 DIAGNOSIS — I62.03: ICD-10-CM

## 2021-03-04 DIAGNOSIS — Z79.899: ICD-10-CM

## 2021-03-04 DIAGNOSIS — G40.901: Primary | ICD-10-CM

## 2021-03-04 DIAGNOSIS — E11.9: ICD-10-CM

## 2021-03-04 DIAGNOSIS — E78.5: ICD-10-CM

## 2021-03-04 DIAGNOSIS — K21.9: ICD-10-CM

## 2021-03-04 DIAGNOSIS — J96.90: ICD-10-CM

## 2021-03-04 DIAGNOSIS — I10: ICD-10-CM

## 2021-03-04 LAB
ALBUMIN SERPL BCG-MCNC: 3.3 G/DL (ref 3.4–4.8)
ALP SERPL-CCNC: 51 U/L (ref 40–110)
ALT SERPL W P-5'-P-CCNC: 20 U/L (ref 8–55)
ANALYZER IN CARDIO: (no result)
ANION GAP SERPL CALC-SCNC: 16 MMOL/L (ref 10–20)
APAP SERPL-MCNC: (no result) MCG/ML (ref 10–30)
AST SERPL-CCNC: 37 U/L (ref 5–34)
BASE EXCESS STD BLDA CALC-SCNC: -2.1 MEQ/L
BASOPHILS # BLD AUTO: 0 10X3/UL (ref 0–0.2)
BASOPHILS NFR BLD AUTO: 0.1 % (ref 0–2)
BILIRUB SERPL-MCNC: 0.6 MG/DL (ref 0.2–1.2)
BUN SERPL-MCNC: 23 MG/DL (ref 8.4–25.7)
CA-I BLDA-SCNC: 1.27 MMOL/L (ref 1.12–1.3)
CALCIUM SERPL-MCNC: 9.5 MG/DL (ref 7.8–10.44)
CHLORIDE SERPL-SCNC: 111 MMOL/L (ref 98–107)
CO2 SERPL-SCNC: 26 MMOL/L (ref 23–31)
CREAT CL PREDICTED SERPL C-G-VRATE: 0 ML/MIN (ref 70–130)
DIGOXIN SERPL-MCNC: 0.54 NG/ML (ref 0.8–2)
DRUG SCREEN CUTOFF: (no result)
EOSINOPHIL # BLD AUTO: 0 10X3/UL (ref 0–0.5)
EOSINOPHIL NFR BLD AUTO: 0 % (ref 0–6)
GLOBULIN SER CALC-MCNC: 3 G/DL (ref 2.4–3.5)
GLUCOSE SERPL-MCNC: 117 MG/DL (ref 83–110)
HCO3 BLDA-SCNC: 23.4 MEQ/L (ref 22–28)
HCT VFR BLDA CALC: 37 % (ref 42–52)
HGB BLD-MCNC: 12.8 G/DL (ref 13.5–17.5)
HGB BLDA-MCNC: 12.6 G/DL (ref 14–18)
LEUKOCYTE ESTERASE UR QL STRIP.AUTO: 25
LYMPHOCYTES NFR BLD AUTO: 6.2 % (ref 18–47)
MCH RBC QN AUTO: 25.7 PG (ref 27–33)
MCV RBC AUTO: 85.7 FL (ref 81.2–95.1)
MONOCYTES # BLD AUTO: 1.4 10X3/UL (ref 0–1.1)
MONOCYTES NFR BLD AUTO: 11.2 % (ref 0–10)
NEUTROPHILS # BLD AUTO: 10.1 10X3/UL (ref 1.5–8.4)
NEUTROPHILS NFR BLD AUTO: 82.3 % (ref 40–75)
PCO2 BLDA: 42.8 MMHG (ref 35–45)
PH BLDA: 7.36 [PH] (ref 7.35–7.45)
PLATELET # BLD AUTO: 218 10X3/UL (ref 150–450)
PO2 BLDA: 296.6 MMHG (ref 70–?)
POTASSIUM BLD-SCNC: 3.7 MMOL/L (ref 3.7–5.3)
POTASSIUM SERPL-SCNC: 4.4 MMOL/L (ref 3.5–5.1)
PROT UR STRIP.AUTO-MCNC: 30 MG/DL
RBC # BLD AUTO: 4.98 10X6/UL (ref 4.32–5.72)
RBC UR QL AUTO: (no result) HPF (ref 0–3)
SALICYLATES SERPL-MCNC: (no result) MG/DL (ref 15–30)
SODIUM SERPL-SCNC: 149 MMOL/L (ref 136–145)
SP GR UR STRIP: 1.02 (ref 1–1.04)
SPECIMEN DRAWN FROM PATIENT: (no result)
WBC # BLD AUTO: 12.3 10X3/UL (ref 3.5–10.5)

## 2021-03-04 PROCEDURE — 80306 DRUG TEST PRSMV INSTRMNT: CPT

## 2021-03-04 PROCEDURE — 36416 COLLJ CAPILLARY BLOOD SPEC: CPT

## 2021-03-04 PROCEDURE — 96376 TX/PRO/DX INJ SAME DRUG ADON: CPT

## 2021-03-04 PROCEDURE — 87040 BLOOD CULTURE FOR BACTERIA: CPT

## 2021-03-04 PROCEDURE — 71045 X-RAY EXAM CHEST 1 VIEW: CPT

## 2021-03-04 PROCEDURE — 82140 ASSAY OF AMMONIA: CPT

## 2021-03-04 PROCEDURE — 80185 ASSAY OF PHENYTOIN TOTAL: CPT

## 2021-03-04 PROCEDURE — U0002 COVID-19 LAB TEST NON-CDC: HCPCS

## 2021-03-04 PROCEDURE — 81003 URINALYSIS AUTO W/O SCOPE: CPT

## 2021-03-04 PROCEDURE — 80307 DRUG TEST PRSMV CHEM ANLYZR: CPT

## 2021-03-04 PROCEDURE — 36600 WITHDRAWAL OF ARTERIAL BLOOD: CPT

## 2021-03-04 PROCEDURE — 82962 GLUCOSE BLOOD TEST: CPT

## 2021-03-04 PROCEDURE — 94002 VENT MGMT INPAT INIT DAY: CPT

## 2021-03-04 PROCEDURE — 96375 TX/PRO/DX INJ NEW DRUG ADDON: CPT

## 2021-03-04 PROCEDURE — 85025 COMPLETE CBC W/AUTO DIFF WBC: CPT

## 2021-03-04 PROCEDURE — 82805 BLOOD GASES W/O2 SATURATION: CPT

## 2021-03-04 PROCEDURE — 51702 INSERT TEMP BLADDER CATH: CPT

## 2021-03-04 PROCEDURE — 31500 INSERT EMERGENCY AIRWAY: CPT

## 2021-03-04 PROCEDURE — 96360 HYDRATION IV INFUSION INIT: CPT

## 2021-03-04 PROCEDURE — 96366 THER/PROPH/DIAG IV INF ADDON: CPT

## 2021-03-04 PROCEDURE — 81015 MICROSCOPIC EXAM OF URINE: CPT

## 2021-03-04 PROCEDURE — 94760 N-INVAS EAR/PLS OXIMETRY 1: CPT

## 2021-03-04 PROCEDURE — 96365 THER/PROPH/DIAG IV INF INIT: CPT

## 2021-03-04 PROCEDURE — 70450 CT HEAD/BRAIN W/O DYE: CPT

## 2021-03-04 PROCEDURE — 83605 ASSAY OF LACTIC ACID: CPT

## 2021-03-04 PROCEDURE — 99292 CRITICAL CARE ADDL 30 MIN: CPT

## 2021-03-04 PROCEDURE — 87086 URINE CULTURE/COLONY COUNT: CPT

## 2021-03-04 PROCEDURE — 80053 COMPREHEN METABOLIC PANEL: CPT

## 2021-03-04 PROCEDURE — 93005 ELECTROCARDIOGRAM TRACING: CPT

## 2021-03-04 PROCEDURE — 99291 CRITICAL CARE FIRST HOUR: CPT

## 2021-03-04 PROCEDURE — 80162 ASSAY OF DIGOXIN TOTAL: CPT

## 2021-03-04 PROCEDURE — 84443 ASSAY THYROID STIM HORMONE: CPT
